# Patient Record
Sex: MALE | Race: WHITE | NOT HISPANIC OR LATINO
[De-identification: names, ages, dates, MRNs, and addresses within clinical notes are randomized per-mention and may not be internally consistent; named-entity substitution may affect disease eponyms.]

---

## 2017-01-11 ENCOUNTER — APPOINTMENT (OUTPATIENT)
Dept: FAMILY MEDICINE | Facility: CLINIC | Age: 72
End: 2017-01-11

## 2017-01-11 VITALS
DIASTOLIC BLOOD PRESSURE: 78 MMHG | RESPIRATION RATE: 18 BRPM | HEART RATE: 74 BPM | TEMPERATURE: 98.4 F | SYSTOLIC BLOOD PRESSURE: 124 MMHG

## 2017-04-12 ENCOUNTER — APPOINTMENT (OUTPATIENT)
Dept: FAMILY MEDICINE | Facility: CLINIC | Age: 72
End: 2017-04-12

## 2017-04-13 VITALS
TEMPERATURE: 98.8 F | RESPIRATION RATE: 18 BRPM | HEART RATE: 78 BPM | SYSTOLIC BLOOD PRESSURE: 124 MMHG | DIASTOLIC BLOOD PRESSURE: 78 MMHG

## 2017-06-14 ENCOUNTER — APPOINTMENT (OUTPATIENT)
Dept: FAMILY MEDICINE | Facility: CLINIC | Age: 72
End: 2017-06-14

## 2017-06-14 VITALS
TEMPERATURE: 98.6 F | SYSTOLIC BLOOD PRESSURE: 130 MMHG | RESPIRATION RATE: 20 BRPM | HEART RATE: 76 BPM | DIASTOLIC BLOOD PRESSURE: 82 MMHG

## 2017-06-15 LAB
ALBUMIN SERPL ELPH-MCNC: 4.5 G/DL
ALP BLD-CCNC: 63 U/L
ALT SERPL-CCNC: 16 U/L
ANION GAP SERPL CALC-SCNC: 14 MMOL/L
AST SERPL-CCNC: 22 U/L
BASOPHILS # BLD AUTO: 0.01 K/UL
BASOPHILS NFR BLD AUTO: 0.2 %
BILIRUB SERPL-MCNC: 0.8 MG/DL
BUN SERPL-MCNC: 12 MG/DL
CALCIUM SERPL-MCNC: 9.2 MG/DL
CHLORIDE SERPL-SCNC: 98 MMOL/L
CHOLEST SERPL-MCNC: 213 MG/DL
CHOLEST/HDLC SERPL: 5.1 RATIO
CO2 SERPL-SCNC: 25 MMOL/L
CREAT SERPL-MCNC: 0.87 MG/DL
EOSINOPHIL # BLD AUTO: 0 K/UL
EOSINOPHIL NFR BLD AUTO: 0 %
GLUCOSE SERPL-MCNC: 108 MG/DL
HCT VFR BLD CALC: 47.4 %
HDLC SERPL-MCNC: 42 MG/DL
HGB BLD-MCNC: 15 G/DL
IMM GRANULOCYTES NFR BLD AUTO: 0.2 %
LDLC SERPL CALC-MCNC: 126 MG/DL
LYMPHOCYTES # BLD AUTO: 1.39 K/UL
LYMPHOCYTES NFR BLD AUTO: 24.3 %
MAN DIFF?: NORMAL
MCHC RBC-ENTMCNC: 30.9 PG
MCHC RBC-ENTMCNC: 31.6 GM/DL
MCV RBC AUTO: 97.7 FL
MONOCYTES # BLD AUTO: 0.6 K/UL
MONOCYTES NFR BLD AUTO: 10.5 %
NEUTROPHILS # BLD AUTO: 3.7 K/UL
NEUTROPHILS NFR BLD AUTO: 64.8 %
PLATELET # BLD AUTO: 193 K/UL
POTASSIUM SERPL-SCNC: 4 MMOL/L
PROT SERPL-MCNC: 7.5 G/DL
RBC # BLD: 4.85 M/UL
RBC # FLD: 14.9 %
SODIUM SERPL-SCNC: 137 MMOL/L
TRIGL SERPL-MCNC: 225 MG/DL
TSH SERPL-ACNC: 1.53 UIU/ML
WBC # FLD AUTO: 5.71 K/UL

## 2017-08-28 ENCOUNTER — APPOINTMENT (OUTPATIENT)
Dept: FAMILY MEDICINE | Facility: CLINIC | Age: 72
End: 2017-08-28
Payer: MEDICARE

## 2017-08-28 VITALS
WEIGHT: 154 LBS | OXYGEN SATURATION: 97 % | HEIGHT: 69 IN | TEMPERATURE: 98.2 F | DIASTOLIC BLOOD PRESSURE: 83 MMHG | SYSTOLIC BLOOD PRESSURE: 124 MMHG | RESPIRATION RATE: 15 BRPM | HEART RATE: 67 BPM | BODY MASS INDEX: 22.81 KG/M2

## 2017-08-28 DIAGNOSIS — Z12.5 ENCOUNTER FOR SCREENING FOR MALIGNANT NEOPLASM OF PROSTATE: ICD-10-CM

## 2017-08-28 PROCEDURE — 99214 OFFICE O/P EST MOD 30 MIN: CPT

## 2017-09-13 ENCOUNTER — APPOINTMENT (OUTPATIENT)
Dept: FAMILY MEDICINE | Facility: CLINIC | Age: 72
End: 2017-09-13

## 2018-01-05 ENCOUNTER — RX RENEWAL (OUTPATIENT)
Age: 73
End: 2018-01-05

## 2018-01-11 ENCOUNTER — APPOINTMENT (OUTPATIENT)
Dept: FAMILY MEDICINE | Facility: CLINIC | Age: 73
End: 2018-01-11
Payer: MEDICARE

## 2018-01-11 VITALS
TEMPERATURE: 98.2 F | WEIGHT: 154 LBS | RESPIRATION RATE: 15 BRPM | DIASTOLIC BLOOD PRESSURE: 78 MMHG | SYSTOLIC BLOOD PRESSURE: 120 MMHG | BODY MASS INDEX: 22.81 KG/M2 | HEIGHT: 69 IN | OXYGEN SATURATION: 98 % | HEART RATE: 70 BPM

## 2018-01-11 DIAGNOSIS — Z23 ENCOUNTER FOR IMMUNIZATION: ICD-10-CM

## 2018-01-11 DIAGNOSIS — G47.00 INSOMNIA, UNSPECIFIED: ICD-10-CM

## 2018-01-11 DIAGNOSIS — F41.9 ANXIETY DISORDER, UNSPECIFIED: ICD-10-CM

## 2018-01-11 DIAGNOSIS — F32.9 MAJOR DEPRESSIVE DISORDER, SINGLE EPISODE, UNSPECIFIED: ICD-10-CM

## 2018-01-11 DIAGNOSIS — I10 ESSENTIAL (PRIMARY) HYPERTENSION: ICD-10-CM

## 2018-01-11 PROCEDURE — 99214 OFFICE O/P EST MOD 30 MIN: CPT | Mod: 25

## 2018-01-11 PROCEDURE — 90732 PPSV23 VACC 2 YRS+ SUBQ/IM: CPT

## 2018-01-11 PROCEDURE — G0009: CPT

## 2018-04-18 ENCOUNTER — RX RENEWAL (OUTPATIENT)
Age: 73
End: 2018-04-18

## 2018-04-18 DIAGNOSIS — Z23 ENCOUNTER FOR IMMUNIZATION: ICD-10-CM

## 2018-04-18 RX ORDER — ZOSTER VACCINE RECOMBINANT, ADJUVANTED 50 MCG/0.5
50 KIT INTRAMUSCULAR
Qty: 1 | Refills: 1 | Status: ACTIVE | COMMUNITY
Start: 2018-04-18 | End: 1900-01-01

## 2023-05-17 ENCOUNTER — INPATIENT (INPATIENT)
Facility: HOSPITAL | Age: 78
LOS: 4 days | Discharge: HOME | DRG: 884 | End: 2023-05-22
Attending: HOSPITALIST | Admitting: HOSPITALIST
Payer: MEDICARE

## 2023-05-17 VITALS
TEMPERATURE: 98 F | DIASTOLIC BLOOD PRESSURE: 120 MMHG | HEART RATE: 111 BPM | SYSTOLIC BLOOD PRESSURE: 184 MMHG | RESPIRATION RATE: 18 BRPM | OXYGEN SATURATION: 97 %

## 2023-05-17 DIAGNOSIS — F03.918 UNSPECIFIED DEMENTIA, UNSPECIFIED SEVERITY, WITH OTHER BEHAVIORAL DISTURBANCE: ICD-10-CM

## 2023-05-17 DIAGNOSIS — F09 UNSPECIFIED MENTAL DISORDER DUE TO KNOWN PHYSIOLOGICAL CONDITION: ICD-10-CM

## 2023-05-17 LAB
ALBUMIN SERPL ELPH-MCNC: 4.3 G/DL — SIGNIFICANT CHANGE UP (ref 3.3–5.2)
ALP SERPL-CCNC: 56 U/L — SIGNIFICANT CHANGE UP (ref 40–120)
ALT FLD-CCNC: 17 U/L — SIGNIFICANT CHANGE UP
ANION GAP SERPL CALC-SCNC: 14 MMOL/L — SIGNIFICANT CHANGE UP (ref 5–17)
APPEARANCE UR: CLEAR — SIGNIFICANT CHANGE UP
AST SERPL-CCNC: 21 U/L — SIGNIFICANT CHANGE UP
BACTERIA # UR AUTO: ABNORMAL
BASOPHILS # BLD AUTO: 0.03 K/UL — SIGNIFICANT CHANGE UP (ref 0–0.2)
BASOPHILS NFR BLD AUTO: 0.4 % — SIGNIFICANT CHANGE UP (ref 0–2)
BILIRUB SERPL-MCNC: 0.3 MG/DL — LOW (ref 0.4–2)
BILIRUB UR-MCNC: NEGATIVE — SIGNIFICANT CHANGE UP
BUN SERPL-MCNC: 16.4 MG/DL — SIGNIFICANT CHANGE UP (ref 8–20)
CALCIUM SERPL-MCNC: 9.1 MG/DL — SIGNIFICANT CHANGE UP (ref 8.4–10.5)
CHLORIDE SERPL-SCNC: 102 MMOL/L — SIGNIFICANT CHANGE UP (ref 96–108)
CO2 SERPL-SCNC: 24 MMOL/L — SIGNIFICANT CHANGE UP (ref 22–29)
COLOR SPEC: YELLOW — SIGNIFICANT CHANGE UP
CREAT SERPL-MCNC: 0.82 MG/DL — SIGNIFICANT CHANGE UP (ref 0.5–1.3)
DIFF PNL FLD: NEGATIVE — SIGNIFICANT CHANGE UP
EGFR: 91 ML/MIN/1.73M2 — SIGNIFICANT CHANGE UP
EOSINOPHIL # BLD AUTO: 0.01 K/UL — SIGNIFICANT CHANGE UP (ref 0–0.5)
EOSINOPHIL NFR BLD AUTO: 0.1 % — SIGNIFICANT CHANGE UP (ref 0–6)
EPI CELLS # UR: SIGNIFICANT CHANGE UP
FLUAV AG NPH QL: SIGNIFICANT CHANGE UP
FLUBV AG NPH QL: SIGNIFICANT CHANGE UP
GLUCOSE SERPL-MCNC: 112 MG/DL — HIGH (ref 70–99)
GLUCOSE UR QL: NEGATIVE MG/DL — SIGNIFICANT CHANGE UP
HCT VFR BLD CALC: 40.6 % — SIGNIFICANT CHANGE UP (ref 39–50)
HGB BLD-MCNC: 14.3 G/DL — SIGNIFICANT CHANGE UP (ref 13–17)
IMM GRANULOCYTES NFR BLD AUTO: 0.3 % — SIGNIFICANT CHANGE UP (ref 0–0.9)
KETONES UR-MCNC: NEGATIVE — SIGNIFICANT CHANGE UP
LEUKOCYTE ESTERASE UR-ACNC: NEGATIVE — SIGNIFICANT CHANGE UP
LYMPHOCYTES # BLD AUTO: 1.03 K/UL — SIGNIFICANT CHANGE UP (ref 1–3.3)
LYMPHOCYTES # BLD AUTO: 13.9 % — SIGNIFICANT CHANGE UP (ref 13–44)
MCHC RBC-ENTMCNC: 31.9 PG — SIGNIFICANT CHANGE UP (ref 27–34)
MCHC RBC-ENTMCNC: 35.2 GM/DL — SIGNIFICANT CHANGE UP (ref 32–36)
MCV RBC AUTO: 90.6 FL — SIGNIFICANT CHANGE UP (ref 80–100)
MONOCYTES # BLD AUTO: 0.73 K/UL — SIGNIFICANT CHANGE UP (ref 0–0.9)
MONOCYTES NFR BLD AUTO: 9.9 % — SIGNIFICANT CHANGE UP (ref 2–14)
NEUTROPHILS # BLD AUTO: 5.57 K/UL — SIGNIFICANT CHANGE UP (ref 1.8–7.4)
NEUTROPHILS NFR BLD AUTO: 75.4 % — SIGNIFICANT CHANGE UP (ref 43–77)
NITRITE UR-MCNC: NEGATIVE — SIGNIFICANT CHANGE UP
PH UR: 7 — SIGNIFICANT CHANGE UP (ref 5–8)
PLATELET # BLD AUTO: 210 K/UL — SIGNIFICANT CHANGE UP (ref 150–400)
POTASSIUM SERPL-MCNC: 3.9 MMOL/L — SIGNIFICANT CHANGE UP (ref 3.5–5.3)
POTASSIUM SERPL-SCNC: 3.9 MMOL/L — SIGNIFICANT CHANGE UP (ref 3.5–5.3)
PROT SERPL-MCNC: 6.7 G/DL — SIGNIFICANT CHANGE UP (ref 6.6–8.7)
PROT UR-MCNC: NEGATIVE — SIGNIFICANT CHANGE UP
RBC # BLD: 4.48 M/UL — SIGNIFICANT CHANGE UP (ref 4.2–5.8)
RBC # FLD: 12.8 % — SIGNIFICANT CHANGE UP (ref 10.3–14.5)
RBC CASTS # UR COMP ASSIST: SIGNIFICANT CHANGE UP /HPF (ref 0–4)
RSV RNA NPH QL NAA+NON-PROBE: SIGNIFICANT CHANGE UP
SARS-COV-2 RNA SPEC QL NAA+PROBE: SIGNIFICANT CHANGE UP
SODIUM SERPL-SCNC: 140 MMOL/L — SIGNIFICANT CHANGE UP (ref 135–145)
SP GR SPEC: 1.01 — SIGNIFICANT CHANGE UP (ref 1.01–1.02)
UROBILINOGEN FLD QL: NEGATIVE MG/DL — SIGNIFICANT CHANGE UP
WBC # BLD: 7.39 K/UL — SIGNIFICANT CHANGE UP (ref 3.8–10.5)
WBC # FLD AUTO: 7.39 K/UL — SIGNIFICANT CHANGE UP (ref 3.8–10.5)
WBC UR QL: SIGNIFICANT CHANGE UP /HPF (ref 0–5)

## 2023-05-17 PROCEDURE — 93010 ELECTROCARDIOGRAM REPORT: CPT

## 2023-05-17 PROCEDURE — 71045 X-RAY EXAM CHEST 1 VIEW: CPT | Mod: 26

## 2023-05-17 PROCEDURE — 99222 1ST HOSP IP/OBS MODERATE 55: CPT

## 2023-05-17 PROCEDURE — 70450 CT HEAD/BRAIN W/O DYE: CPT | Mod: 26,MA

## 2023-05-17 PROCEDURE — 99285 EMERGENCY DEPT VISIT HI MDM: CPT

## 2023-05-17 RX ORDER — LISINOPRIL 2.5 MG/1
10 TABLET ORAL DAILY
Refills: 0 | Status: DISCONTINUED | OUTPATIENT
Start: 2023-05-17 | End: 2023-05-22

## 2023-05-17 RX ORDER — ONDANSETRON 8 MG/1
4 TABLET, FILM COATED ORAL EVERY 8 HOURS
Refills: 0 | Status: DISCONTINUED | OUTPATIENT
Start: 2023-05-17 | End: 2023-05-22

## 2023-05-17 RX ORDER — QUETIAPINE FUMARATE 200 MG/1
25 TABLET, FILM COATED ORAL AT BEDTIME
Refills: 0 | Status: DISCONTINUED | OUTPATIENT
Start: 2023-05-17 | End: 2023-05-22

## 2023-05-17 RX ORDER — FINASTERIDE 5 MG/1
5 TABLET, FILM COATED ORAL DAILY
Refills: 0 | Status: DISCONTINUED | OUTPATIENT
Start: 2023-05-17 | End: 2023-05-22

## 2023-05-17 RX ORDER — ATORVASTATIN CALCIUM 80 MG/1
40 TABLET, FILM COATED ORAL AT BEDTIME
Refills: 0 | Status: DISCONTINUED | OUTPATIENT
Start: 2023-05-17 | End: 2023-05-22

## 2023-05-17 RX ORDER — LANOLIN ALCOHOL/MO/W.PET/CERES
3 CREAM (GRAM) TOPICAL AT BEDTIME
Refills: 0 | Status: DISCONTINUED | OUTPATIENT
Start: 2023-05-17 | End: 2023-05-22

## 2023-05-17 RX ORDER — QUETIAPINE FUMARATE 200 MG/1
12.5 TABLET, FILM COATED ORAL
Refills: 0 | Status: DISCONTINUED | OUTPATIENT
Start: 2023-05-17 | End: 2023-05-22

## 2023-05-17 RX ORDER — ATENOLOL 25 MG/1
50 TABLET ORAL DAILY
Refills: 0 | Status: DISCONTINUED | OUTPATIENT
Start: 2023-05-17 | End: 2023-05-22

## 2023-05-17 RX ORDER — ACETAMINOPHEN 500 MG
650 TABLET ORAL EVERY 6 HOURS
Refills: 0 | Status: DISCONTINUED | OUTPATIENT
Start: 2023-05-17 | End: 2023-05-22

## 2023-05-17 RX ORDER — SODIUM CHLORIDE 9 MG/ML
3 INJECTION INTRAMUSCULAR; INTRAVENOUS; SUBCUTANEOUS EVERY 8 HOURS
Refills: 0 | Status: DISCONTINUED | OUTPATIENT
Start: 2023-05-17 | End: 2023-05-21

## 2023-05-17 RX ORDER — QUETIAPINE FUMARATE 200 MG/1
25 TABLET, FILM COATED ORAL ONCE
Refills: 0 | Status: COMPLETED | OUTPATIENT
Start: 2023-05-17 | End: 2023-05-17

## 2023-05-17 RX ORDER — TAMSULOSIN HYDROCHLORIDE 0.4 MG/1
0.4 CAPSULE ORAL AT BEDTIME
Refills: 0 | Status: DISCONTINUED | OUTPATIENT
Start: 2023-05-17 | End: 2023-05-22

## 2023-05-17 RX ORDER — OLANZAPINE 15 MG/1
5 TABLET, FILM COATED ORAL ONCE
Refills: 0 | Status: COMPLETED | OUTPATIENT
Start: 2023-05-17 | End: 2023-05-17

## 2023-05-17 RX ADMIN — SODIUM CHLORIDE 3 MILLILITER(S): 9 INJECTION INTRAMUSCULAR; INTRAVENOUS; SUBCUTANEOUS at 22:38

## 2023-05-17 RX ADMIN — OLANZAPINE 5 MILLIGRAM(S): 15 TABLET, FILM COATED ORAL at 20:15

## 2023-05-17 RX ADMIN — QUETIAPINE FUMARATE 25 MILLIGRAM(S): 200 TABLET, FILM COATED ORAL at 23:20

## 2023-05-17 RX ADMIN — TAMSULOSIN HYDROCHLORIDE 0.4 MILLIGRAM(S): 0.4 CAPSULE ORAL at 23:20

## 2023-05-17 RX ADMIN — ATORVASTATIN CALCIUM 40 MILLIGRAM(S): 80 TABLET, FILM COATED ORAL at 23:20
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
## 2023-05-17 NOTE — H&P ADULT - ASSESSMENT
79 y/o male with Dementia, HTN, HLD, PH    Dementia:  -Obtain recent w/u done at [place]  -No evidence of infectious/metabolic cause here  -CTH with severe microvascular disease, likely Vascular dementia   - notes reviewed, meds ordered  -Currently calm and cooperative   - for placement  -OOB, ambulate, fall risk, 1:1  -VC boots while in bed    HTN:  -DASH diet  -Resume Atenolol     HLD:  -Statin    PH:  -Proscar/Flomax  -No LUTs     Discussed with ED staff 79 y/o male with Dementia, HTN, HLD, PH    Dementia:  -Obtain recent w/u done at [place]  -No evidence of infectious/metabolic cause here  -CTH with severe microvascular disease, likely Vascular dementia   - notes reviewed, meds ordered  -Currently calm and cooperative   -Check TSH, B12, FA, RPR  -SW for placement  -OOB, ambulate, fall risk, 1:1  -VC boots while in bed    HTN:  -DASH diet  -Resume Atenolol     HLD:  -Statin    PH:  -Proscar/Flomax  -No LUTs     Discussed with ED staff
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
## 2023-05-17 NOTE — ED ADULT NURSE REASSESSMENT NOTE - NS ED NURSE REASSESS COMMENT FT1
assumed care from KARLEY Mckee. pt is 79 y/o/m w/pmhx HLD, HTN and BPH presents with brother c/o worsening AMS. pt was seen and eval at [facility] 3 weeks ago and dx with dementia with full workup. pt son is concerned as he got aggressive today feels he is unable to care for his brother. brother Broderick at bedside. pt is awake and alert x 1-2, self and knows he is in the hospital. pt does not recognize his brother, states "I don't know that nahid". pt continues to walk around unit, redirectable back to stretcher. charge RN aware the need for 1:1 as pt keeps going in other patients rooms.

## 2023-05-17 NOTE — ED BEHAVIORAL HEALTH ASSESSMENT NOTE - DESCRIPTION
HLD, HTN, BPH T(C): 36.5 (05-17-23 @ 18:31), Max: 36.8 (05-17-23 @ 16:25)  T(F): 97.7 (05-17-23 @ 18:31), Max: 98.3 (05-17-23 @ 16:25)  HR: 81 (05-17-23 @ 18:31) (81 - 111)  BP: 179/92 (05-17-23 @ 18:31) (179/92 - 184/120)  RR: 16 (05-17-23 @ 18:31) (16 - 18)  SpO2: 96% (05-17-23 @ 18:31) (96% - 97%) single, never , no children retired

## 2023-05-17 NOTE — ED BEHAVIORAL HEALTH ASSESSMENT NOTE - OTHER
family brother limited impaired complete medical workup to rule out causes of Delirium, to provide Dementia diagnosis and possible long tern placement
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
## 2023-05-17 NOTE — ED ADULT NURSE NOTE - NSFALLRISKINTERV_ED_ALL_ED

## 2023-05-17 NOTE — ED ADULT NURSE REASSESSMENT NOTE - NSFALLHARMRISKINTERV_ED_ALL_ED
Assistance OOB with selected safe patient handling equipment if applicable/Communicate risk of Fall with Harm to all staff, patient, and family/Monitor for mental status changes and reorient to person, place, and time, as needed/Move patient closer to nursing station/within visual sight of ED staff/Provide visual cue: red socks, yellow wristband, yellow gown, etc/Reinforce activity limits and safety measures with patient and family/Toileting schedule using arm’s reach rule for commode and bathroom/Use of alarms - bed, stretcher, chair and/or video monitoring/Bed in lowest position, wheels locked, appropriate side rails in place/Call bell, personal items and telephone in reach/Instruct patient to call for assistance before getting out of bed/chair/stretcher/Non-slip footwear applied when patient is off stretcher/[place] to call system/Physically safe environment - no spills, clutter or unnecessary equipment/Purposeful Proactive Rounding/Room/bathroom lighting operational, light cord in reach

## 2023-05-17 NOTE — H&P ADULT - HISTORY OF PRESENT ILLNESS
77 y/o male with hx of HTN, PH, HLD, recently diagnosed Dementia after a year of stepwise decline in cognitive function and ADLs. Had a recent evaluation at [facility] with a reported negative metabolic/infection/CVA w/u. He is no longer able to live on his own and was living with his family for past 3 weeks. Brought in today due to periods of aggression and agitation. Patient also not taking his usual medications at times. No reported falls, fever, N/V, SOB, CP. Patient is ambulatory with no devices. In the ED needed Zyprexa for agitation. Vital with elevated BP, exam non-focal, CTH with chronic microvascular changes, Labs normal. No reports of substance abuse. Seen by BH/CM in ED and at this time will need evaluation for placement due to unsafe DC and family unable to care for him at home. Patient at this time not c/o CP, HA, N/V, SOB.

## 2023-05-17 NOTE — ED ADULT NURSE NOTE - OBJECTIVE STATEMENT
Pt care acquired at 1700. Pt is A&Ox4 with bizarre behavior. Pt arrives at ED due to elloping from home multiple times. PT has hx of severe dementia but is oriented to self and place on assessment. PT denies pain or discomfort. Pt awaiting SW consult.

## 2023-05-17 NOTE — ED PROVIDER NOTE - CLINICAL SUMMARY MEDICAL DECISION MAKING FREE TEXT BOX
78y M w/ hx HTN, HLD, BPH, presenting for worsening dementia and agitation. No acute findings on workup to explain mental status. Treated with zyprexa for agitation. 1:1 ordered for safety. Psych/case management consulted; advising inpatient medical admission pending longterm placement.

## 2023-05-17 NOTE — ED PROVIDER NOTE - PROGRESS NOTE DETAILS
Guerra: Pt received in signout from Dr. Israel. Pt increasingly agitated and uncooperative despite attempts at verbal redirection. Treated with zyprexa with improvement. Case discussed with psych/case management team. Pt is a danger to other family members and likely needs longterm placement. Psych advising inpatient medical admission as it will likely take at least 3 days for him to be placed. No acute medical findings on workup. Case discussed with hospitalist for admission.
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
## 2023-05-17 NOTE — H&P ADULT - NSICDXPASTMEDICALHX_GEN_ALL_CORE_FT
PAST MEDICAL HISTORY:  Dementia     Enlarged prostate     HLD (hyperlipidemia)     HTN (hypertension)

## 2023-05-18 LAB
CULTURE RESULTS: SIGNIFICANT CHANGE UP
FOLATE SERPL-MCNC: 16.6 NG/ML — SIGNIFICANT CHANGE UP
HCV AB S/CO SERPL IA: 0.07 S/CO — SIGNIFICANT CHANGE UP (ref 0–0.99)
HCV AB SERPL-IMP: SIGNIFICANT CHANGE UP
SPECIMEN SOURCE: SIGNIFICANT CHANGE UP
T PALLIDUM AB TITR SER: NEGATIVE — SIGNIFICANT CHANGE UP
TSH SERPL-MCNC: 2.47 UIU/ML — SIGNIFICANT CHANGE UP (ref 0.27–4.2)
TSH SERPL-MCNC: 2.51 UIU/ML — SIGNIFICANT CHANGE UP (ref 0.27–4.2)
VIT B12 SERPL-MCNC: 348 PG/ML — SIGNIFICANT CHANGE UP (ref 232–1245)

## 2023-05-18 PROCEDURE — 99223 1ST HOSP IP/OBS HIGH 75: CPT

## 2023-05-18 PROCEDURE — 99233 SBSQ HOSP IP/OBS HIGH 50: CPT

## 2023-05-18 PROCEDURE — 99222 1ST HOSP IP/OBS MODERATE 55: CPT

## 2023-05-18 RX ORDER — HALOPERIDOL DECANOATE 100 MG/ML
5 INJECTION INTRAMUSCULAR ONCE
Refills: 0 | Status: COMPLETED | OUTPATIENT
Start: 2023-05-18 | End: 2023-05-18

## 2023-05-18 RX ORDER — OLANZAPINE 15 MG/1
5 TABLET, FILM COATED ORAL ONCE
Refills: 0 | Status: COMPLETED | OUTPATIENT
Start: 2023-05-18 | End: 2023-05-18

## 2023-05-18 RX ORDER — OLANZAPINE 15 MG/1
2.5 TABLET, FILM COATED ORAL ONCE
Refills: 0 | Status: COMPLETED | OUTPATIENT
Start: 2023-05-18 | End: 2023-05-18

## 2023-05-18 RX ORDER — OLANZAPINE 15 MG/1
2.5 TABLET, FILM COATED ORAL ONCE
Refills: 0 | Status: DISCONTINUED | OUTPATIENT
Start: 2023-05-18 | End: 2023-05-18

## 2023-05-18 RX ADMIN — SODIUM CHLORIDE 3 MILLILITER(S): 9 INJECTION INTRAMUSCULAR; INTRAVENOUS; SUBCUTANEOUS at 09:55

## 2023-05-18 RX ADMIN — HALOPERIDOL DECANOATE 5 MILLIGRAM(S): 100 INJECTION INTRAMUSCULAR at 19:56

## 2023-05-18 RX ADMIN — TAMSULOSIN HYDROCHLORIDE 0.4 MILLIGRAM(S): 0.4 CAPSULE ORAL at 21:48

## 2023-05-18 RX ADMIN — OLANZAPINE 5 MILLIGRAM(S): 15 TABLET, FILM COATED ORAL at 19:57

## 2023-05-18 RX ADMIN — SODIUM CHLORIDE 3 MILLILITER(S): 9 INJECTION INTRAMUSCULAR; INTRAVENOUS; SUBCUTANEOUS at 05:15

## 2023-05-18 RX ADMIN — ATENOLOL 50 MILLIGRAM(S): 25 TABLET ORAL at 06:02

## 2023-05-18 RX ADMIN — Medication 2 MILLIGRAM(S): at 19:56

## 2023-05-18 RX ADMIN — QUETIAPINE FUMARATE 12.5 MILLIGRAM(S): 200 TABLET, FILM COATED ORAL at 06:02

## 2023-05-18 RX ADMIN — SODIUM CHLORIDE 3 MILLILITER(S): 9 INJECTION INTRAMUSCULAR; INTRAVENOUS; SUBCUTANEOUS at 22:04

## 2023-05-18 RX ADMIN — QUETIAPINE FUMARATE 25 MILLIGRAM(S): 200 TABLET, FILM COATED ORAL at 21:48

## 2023-05-18 RX ADMIN — ATORVASTATIN CALCIUM 40 MILLIGRAM(S): 80 TABLET, FILM COATED ORAL at 21:48

## 2023-05-18 RX ADMIN — OLANZAPINE 5 MILLIGRAM(S): 15 TABLET, FILM COATED ORAL at 17:32

## 2023-05-18 RX ADMIN — FINASTERIDE 5 MILLIGRAM(S): 5 TABLET, FILM COATED ORAL at 11:01

## 2023-05-18 RX ADMIN — OLANZAPINE 2.5 MILLIGRAM(S): 15 TABLET, FILM COATED ORAL at 18:22

## 2023-05-18 RX ADMIN — LISINOPRIL 10 MILLIGRAM(S): 2.5 TABLET ORAL at 06:02

## 2023-05-18 NOTE — CHART NOTE - NSCHARTNOTEFT_GEN_A_CORE
Palliative care social work note.    SW and palliative care NP Brianna Patel met with patient who is pleasantly confused, dx of dementia. SW aware patient living alone and has no children. Patient has a brother Broderick. Patient initially verbalizing involvement of Broderick in his life and attempt was made to complete HCP . Patient was engaging initially with ability to identify to team that HCP would be person to make decisions in the event patient can no longer . Patient began to rd HCP form and became increasingly paranoid and then could no longer provide clarity of details or thoughts. Patient unable to make detailed medical decisions in current state and paranoia heightened with discussion that Broderick could be involved to help patient. Patient at present unable to have meaningful discussion regarding advance care planning and not insightful or able to rationalize thoughts regarding why or why not brother should be involved or if he felt comfortable with any other extended family member to assist.

## 2023-05-18 NOTE — CONSULT NOTE ADULT - ASSESSMENT
This is a 78 year old male PMHx  HTN, PH, HLD, Dementia arrived to [facility] due to aggression and agitation. He is no longer able to live on his own and was living with his family for past 3 weeks. No reported falls, fever, N/V, SOB, CP. Patient is ambulatory with no devices. In the ED needed Zyprexa for agitation. Vital with elevated BP, exam non-focal, CTH with chronic microvascular changes, Labs normal. Seen by BH/CM in ED and at this time will need evaluation for placement due to unsafe DC and family unable to care for him at home. Palliative care consulted from  for ED Project.    Problem/Recommendation 1:Dementia  Patient with moderate dementia - Functional Assessment Staging Scale (FAST)-4- 5 - IADLs become affected, starting to need assist   Patient able to recognize his short term memory is deteriorating but long term is still stable - able to hold conversation but goes off on tangents and needs to be redirected -Noted some paranoia like thought processes  Still able to, sit stand, walk independently - able to recognize needs for toileting and bathing    Problem/Recommendation 2:AMS/Delirium  Consider trying to avoid/minimize deliriogenic drugs such as benzodiazepines and anticholinergics   Non pharmacologic options for delirium: Frequently orient patient, identify self, maintain consistent staffing and location   Limit stimulation, soft voice, soft lighting, gentle handling  Sleep disturbance support  Provide adequate sensory aides such as hearing aids, glasses, calendar, clock  Soft warm blankets  Bed alarm for safety    Problem/Recommendation 3: Debility  Assist in ADLS  Maintain safety, fall, aspiration precautions  Set bed alarm, chair alarm for safety and fall prevention  Turn and Position in bed     Problem/Recommendation 4: Palliative Care Encounter  Met with patient at bedside, introduced Palliative Care Team and Services at [facility].  Along side with me Palliative care  Miriam Parnell LCSW  Patient able to recognize his short term memory is deteriorating but long term remains more intact- able to hold conversation for a short while but goes off on tangents, becomes illogical, and needs to be redirected -Noted some paranoia like thought processes  Still able to, sit stand, walk independently - able to recognize needs for toileting and bathing  Patient states his brother Broderick (and his family) are the only family members he has  - that he has never been  or had children of his own  Patient unable to appoint HCP - he was unable to explain the reasoning and also unable to identify who he would like to appoint  - It does not appear he has capacity to make a decision regarding HCP designation as he is unable to provide explanation into reasoning/decision    Palliative care to follow    Surrogate/HCP/Guardian: Phone#:Broderick Bowles (brother) [phone]    Total Time Spent__50 minutes  This includes chart review, patient assessment, discussion and collaboration with interdisciplinary team members, ACP planning  COUNSELING:  Face to face meeting to discuss Advanced Care Planning - Time Spent _18___Minutes.      Thank you for the opportunity to assist with the care of this patient.   [facility] Palliative Medicine Consult Service [phone]. 
The patient is a 78y Male with dementia now with increased agitation.    Dementia.   Given stepwise decline and CT findings, likely vascular dementia.  His work up for reversible causes, negative.    Agitation.  Seen by Behavioral Health Team.  Agree with Quetiapine for now.     Discharge planning.   Will likely need long term care at a facility with a dementia unit.    Case discussed with Dr Robbins.

## 2023-05-18 NOTE — PROGRESS NOTE ADULT - ASSESSMENT
77 y/o male with Dementia, HTN, HLD, PH requiring placement.     Dementia  -No evidence of infectious/metabolic cause   -CTH with severe microvascular disease, likely Vascular dementia   - notes noted  -Currently calm and cooperative   - for placement  -OOB, ambulate, fall risk, 1:1  - Seroquel 25mg nightly, 12.5mg BID    HTN  - Atenolol 50mg daily  - Lisinopril 10mg daily    HLD  - Lipitor 40mg nightly    PH  -Proscar/Flomax  -No LUTs     DVT ppx  - SCD

## 2023-05-18 NOTE — PATIENT PROFILE ADULT - FALL HARM RISK - HARM RISK INTERVENTIONS
Assistance OOB with selected safe patient handling equipment/Communicate Risk of Fall with Harm to all staff/Monitor for mental status changes/Move patient closer to nurses' station/Reinforce activity limits and safety measures with patient and family/Reorient to person, place and time as needed/Tailored Fall Risk Interventions/Toileting schedule using arm’s reach rule for commode and bathroom/Use of alarms - bed, chair and/or voice tab/Visual Cue: Yellow wristband and red socks/Bed in lowest position, wheels locked, appropriate side rails in place/Call bell, personal items and telephone in reach/Instruct patient to call for assistance before getting out of bed or chair/Non-slip footwear when patient is out of bed/[place] to call system/Physically safe environment - no spills, clutter or unnecessary equipment/Purposeful Proactive Rounding/Room/bathroom lighting operational, light cord in reach

## 2023-05-18 NOTE — PROGRESS NOTE ADULT - SUBJECTIVE AND OBJECTIVE BOX
Chief complaint: Dementia    Patient seen and examined at bedside. No acute overnight events reported. Patient states he is doing fine, no fever, chills, cough, nausea or vomiting.     Vital Signs Last 24 Hrs  T(F): 97.7 (18 May 2023 07:49), Max: 98.3 (17 May 2023 16:25)  HR: 59 (18 May 2023 07:49) (59 - 111)  BP: 106/71 (18 May 2023 07:49) (106/71 - 184/120)  RR: 18 (18 May 2023 07:49) (16 - 18)  SpO2: 99% (18 May 2023 07:49) (96% - 99%)    Physical Exam:  Constitutional: alert and oriented, in no acute distress   Neck: Soft and supple  Respiratory: Clear to auscultation bilaterally, no wheezes or crackles  Cardiovascular: Regular rate and rhythm   Gastrointestinal: Soft, non-tender to palpation, +bs  Vascular: 2+ peripheral pulses  Musculoskeletal: 5/5 strength b/l upper and lower extremities, no lower extremity edema bilaterally    Labs:                        14.3   7.39  )-----------( 210      ( 17 May 2023 17:18 )             40.6   05-17    140  |  102  |  16.4  ----------------------------<  112<H>  3.9   |  24.0  |  0.82    Ca    9.1      17 May 2023 17:18    TPro  6.7  /  Alb  4.3  /  TBili  0.3<L>  /  DBili  x   /  AST  21  /  ALT  17  /  AlkPhos  56  05-17
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
## 2023-05-18 NOTE — CONSULT NOTE ADULT - SUBJECTIVE AND OBJECTIVE BOX
[facility]                                        [facility] at [place]                                  Елена Bradley, & Alejandro                                      [address]                                           [place], [place], [address]                                                [phone]        CC: Dementia and agitation.    HISTORY:  The patient is a 78y Male who has had a cognitive decline and recently diagnosed with dementia now presents with increased agitation and aggression to the point where family is now having difficulty caring for him.     PAST MEDICAL & SURGICAL HISTORY:  HTN (hypertension)  HLD (hyperlipidemia)  Enlarged prostate  Dementia  No significant past surgical history    MEDICATIONS  (STANDING):  atenolol  Tablet 50 milliGRAM(s) Oral daily  atorvastatin 40 milliGRAM(s) Oral at bedtime  finasteride 5 milliGRAM(s) Oral daily  lisinopril 10 milliGRAM(s) Oral daily  QUEtiapine 25 milliGRAM(s) Oral at bedtime  QUEtiapine 12.5 milliGRAM(s) Oral two times a day  sodium chloride 0.9% lock flush 3 milliLiter(s) IV Push every 8 hours  tamsulosin 0.4 milliGRAM(s) Oral at bedtime    MEDICATIONS  (PRN):  acetaminophen     Tablet .. 650 milliGRAM(s) Oral every 6 hours PRN Temp greater or equal to 38C (100.4F), Mild Pain (1 - 3)  aluminum hydroxide/magnesium hydroxide/simethicone Suspension 30 milliLiter(s) Oral every 4 hours PRN Dyspepsia  melatonin 3 milliGRAM(s) Oral at bedtime PRN Insomnia  ondansetron Injectable 4 milliGRAM(s) IV Push every 8 hours PRN Nausea and/or Vomiting    Allergies  Allergy Status Unknown    SOCIAL HISTORY:  Non smoker.     FAMILY HISTORY:  No pertinent family history in first degree relatives  Unable to provide further family history.    ROS:  Constitutional: The patient denies fevers or weight changes.  Neuro: As per HPI.  Eyes: Denies blurry vision.  Ears/nose/throat: Denies Tinnitus.   Cardiac: Denies chest pain. Denies palpitations.  Respiratory: Denies shortness of breath.  GI: Denies abdominal pain, nausea, or vomiting.  : Denies change in urinary pattern.  Integumentary: Denies rash.  Psych: Denies recent mood changes.  Heme: denies easy bleeding/bruising.    Exam:  Vital Signs Last 24 Hrs  T(C): 36.5 (18 May 2023 07:49), Max: 36.8 (17 May 2023 16:25)  T(F): 97.7 (18 May 2023 07:49), Max: 98.3 (17 May 2023 16:25)  HR: 59 (18 May 2023 07:49) (59 - 111)  BP: 106/71 (18 May 2023 07:49) (106/71 - 184/120)  BP(mean): 110 (17 May 2023 22:48) (110 - 110)  RR: 18 (18 May 2023 07:49) (16 - 18)  SpO2: 99% (18 May 2023 07:49) (96% - 99%)    Parameters below as of 18 May 2023 07:49  Patient On (Oxygen Delivery Method): room air    General: NAD.   Carotid bruits absent.     Mental status: The patient is awake, alert, and fully oriented. There is no aphasia. Memory 0/3 @ 5 minutes. Attention span poor.     Cranial nerves: There is no papilledema. Pupils react symmetrically to light. There is no visual field deficit to confrontation. Extraocular motion is full with no nystagmus.  Facial sensation is intact. Facial musculature is symmetric. Palate elevates symmetrically. Tongue is midline.    Motor: There is normal bulk and tone.  Strength is 5/5 in the right arm and leg.   Strength is 5/5 in the left arm and leg.    Sensation: Intact to light touch and pin. There is no extinction to double simultaneous stimulation.    Reflexes: 2+ throughout and plantar responses are flexor.    Cerebellar: There is no dysmetria on finger to nose testing.    LABS:                         14.3   7.39  )-----------( 210      ( 17 May 2023 17:18 )             40.6       05-17    140  |  102  |  16.4  ----------------------------<  112<H>  3.9   |  24.0  |  0.82    Ca    9.1      17 May 2023 17:18    TPro  6.7  /  Alb  4.3  /  TBili  0.3<L>  /  DBili  x   /  AST  21  /  ALT  17  /  AlkPhos  56  05-17    B12: 348  TSH normal.    RADIOLOGY   CT head images reviewed (and concur with report): There is no acute pathology. There is extensive chronic ischemic white matter change.        
Palliative Care Consult  This is a 78 year old male PMHx  HTN, PH, HLD, Dementia arrived to [facility] due to aggression and agitation. He is no longer able to live on his own and was living with his family for past 3 weeks. No reported falls, fever, N/V, SOB, CP. Patient is ambulatory with no devices. In the ED needed Zyprexa for agitation. Vital with elevated BP, exam non-focal, CTH with chronic microvascular changes, Labs normal. Seen by BH/VELIA in ED and at this time will need evaluation for placement due to unsafe DC and family unable to care for him at home. Palliative care consulted from  for ED Project.     <HPI:  77 y/o male with hx of HTN, PH, HLD, recently diagnosed Dementia after a year of stepwise decline in cognitive function and ADLs. Had a recent evaluation at [facility] with a reported negative metabolic/infection/CVA w/u. He is no longer able to live on his own and was living with his family for past 3 weeks. Brought in today due to periods of aggression and agitation. Patient also not taking his usual medications at times. No reported falls, fever, N/V, SOB, CP. Patient is ambulatory with no devices. In the ED needed Zyprexa for agitation. Vital with elevated BP, exam non-focal, CTH with chronic microvascular changes, Labs normal. No reports of substance abuse. Seen by CODEY/VELIA in ED and at this time will need evaluation for placement due to unsafe DC and family unable to care for him at home. Patient at this time not c/o CP, HA, N/V, SOB.  (17 May 2023 22:48)> end of copied text      PERTINENT PMH REVIEWED: Yes     PAST MEDICAL & SURGICAL HISTORY:  HTN (hypertension)      HLD (hyperlipidemia)      Enlarged prostate      Dementia      No significant past surgical history          SOCIAL HISTORY:                      Substance history: none                    Admitted from:  home                     Yazidism/spirituality: none                     Cultural concerns: none                      Surrogate/HCP/Guardian: Phone#:Broderick Bowles Brother [phone]    FAMILY HISTORY:  No family history related to admission diagonsis    Allergies  Allergy Status Unknown    ADVANCE DIRECTIVES/TREATMENT PREFERENCES:  Full Code     Baseline ADLs (prior to admission):  Independent with assist - lives alone - ambulatory     Karnofsky/Palliative Performance Status Version 2:  %50  http://npcrc.org/files/news/palliative_performance_scale_ppsv2.pdf    Present Symptoms: Unable to obtain due to poor mentation     Pain: Denies pain             Character-            Duration-            Effect-            Factors-            Frequency-            Location-            Severity-    Pain AD Score:0  http://geriatrictoolkit.[facility]/cog/painad.pdf (press ctrl + left click to view)    Review of Systems: Reviewed  Unable to obtain due to poor mentation     MEDICATIONS  (STANDING):  atenolol  Tablet 50 milliGRAM(s) Oral daily  atorvastatin 40 milliGRAM(s) Oral at bedtime  finasteride 5 milliGRAM(s) Oral daily  lisinopril 10 milliGRAM(s) Oral daily  QUEtiapine 25 milliGRAM(s) Oral at bedtime  QUEtiapine 12.5 milliGRAM(s) Oral two times a day  sodium chloride 0.9% lock flush 3 milliLiter(s) IV Push every 8 hours  tamsulosin 0.4 milliGRAM(s) Oral at bedtime    MEDICATIONS  (PRN):  acetaminophen     Tablet .. 650 milliGRAM(s) Oral every 6 hours PRN Temp greater or equal to 38C (100.4F), Mild Pain (1 - 3)  aluminum hydroxide/magnesium hydroxide/simethicone Suspension 30 milliLiter(s) Oral every 4 hours PRN Dyspepsia  melatonin 3 milliGRAM(s) Oral at bedtime PRN Insomnia  ondansetron Injectable 4 milliGRAM(s) IV Push every 8 hours PRN Nausea and/or Vomiting      PHYSICAL EXAM:    Vital Signs Last 24 Hrs  T(C): 36.5 (18 May 2023 07:49), Max: 36.8 (17 May 2023 16:25)  T(F): 97.7 (18 May 2023 07:49), Max: 98.3 (17 May 2023 16:25)  HR: 59 (18 May 2023 07:49) (59 - 111)  BP: 106/71 (18 May 2023 07:49) (106/71 - 184/120)  BP(mean): 110 (17 May 2023 22:48) (110 - 110)  RR: 18 (18 May 2023 07:49) (16 - 18)  SpO2: 99% (18 May 2023 07:49) (96% - 99%)    Parameters below as of 18 May 2023 07:49  Patient On (Oxygen Delivery Method): room air    General: alert oriented x 2-3 - mentation fluctuates     HEENT: dry mouth      Lungs: comfortable     CV: normal      GI:  incontinent     : incontinent      MSK: weaknes  bedbound/wheelchair bound    Neuro:  cognitive impairment     Skin: normal     LABS:                        14.3   7.39  )-----------( 210      ( 17 May 2023 17:18 )             40.6         140  |  102  |  16.4  ----------------------------<  112<H>  3.9   |  24.0  |  0.82    Ca    9.1      17 May 2023 17:18    TPro  6.7  /  Alb  4.3  /  TBili  0.3<L>  /  DBili  x   /  AST  21  /  ALT  17  /  AlkPhos  56        Urinalysis Basic - ( 17 May 2023 17:18 )    Color: Yellow / Appearance: Clear / S.010 / pH: x  Gluc: x / Ketone: Negative  / Bili: Negative / Urobili: Negative mg/dL   Blood: x / Protein: Negative / Nitrite: Negative   Leuk Esterase: Negative / RBC: 0-2 /HPF / WBC 0-2 /HPF   Sq Epi: x / Non Sq Epi: x / Bacteria: Occasional    I&O's Summary    RADIOLOGY & ADDITIONAL STUDIES:  CT head 23  IMPRESSION:  No acute intracranial hemorrhage or acute territorial infarction.   Extensive chronic microvascular ischemic changes. If symptoms persist   consider follow-up imaging with MRI of the brain if no contraindications.

## 2023-05-19 LAB
ANION GAP SERPL CALC-SCNC: 12 MMOL/L — SIGNIFICANT CHANGE UP (ref 5–17)
BASOPHILS # BLD AUTO: 0.03 K/UL — SIGNIFICANT CHANGE UP (ref 0–0.2)
BASOPHILS NFR BLD AUTO: 0.4 % — SIGNIFICANT CHANGE UP (ref 0–2)
BUN SERPL-MCNC: 18.8 MG/DL — SIGNIFICANT CHANGE UP (ref 8–20)
CALCIUM SERPL-MCNC: 8.8 MG/DL — SIGNIFICANT CHANGE UP (ref 8.4–10.5)
CHLORIDE SERPL-SCNC: 107 MMOL/L — SIGNIFICANT CHANGE UP (ref 96–108)
CO2 SERPL-SCNC: 25 MMOL/L — SIGNIFICANT CHANGE UP (ref 22–29)
CREAT SERPL-MCNC: 0.77 MG/DL — SIGNIFICANT CHANGE UP (ref 0.5–1.3)
EGFR: 93 ML/MIN/1.73M2 — SIGNIFICANT CHANGE UP
EOSINOPHIL # BLD AUTO: 0.01 K/UL — SIGNIFICANT CHANGE UP (ref 0–0.5)
EOSINOPHIL NFR BLD AUTO: 0.1 % — SIGNIFICANT CHANGE UP (ref 0–6)
GLUCOSE SERPL-MCNC: 94 MG/DL — SIGNIFICANT CHANGE UP (ref 70–99)
HCT VFR BLD CALC: 43.7 % — SIGNIFICANT CHANGE UP (ref 39–50)
HGB BLD-MCNC: 14.8 G/DL — SIGNIFICANT CHANGE UP (ref 13–17)
IMM GRANULOCYTES NFR BLD AUTO: 0.4 % — SIGNIFICANT CHANGE UP (ref 0–0.9)
LYMPHOCYTES # BLD AUTO: 1.52 K/UL — SIGNIFICANT CHANGE UP (ref 1–3.3)
LYMPHOCYTES # BLD AUTO: 18.6 % — SIGNIFICANT CHANGE UP (ref 13–44)
MCHC RBC-ENTMCNC: 32.1 PG — SIGNIFICANT CHANGE UP (ref 27–34)
MCHC RBC-ENTMCNC: 33.9 GM/DL — SIGNIFICANT CHANGE UP (ref 32–36)
MCV RBC AUTO: 94.8 FL — SIGNIFICANT CHANGE UP (ref 80–100)
MONOCYTES # BLD AUTO: 0.65 K/UL — SIGNIFICANT CHANGE UP (ref 0–0.9)
MONOCYTES NFR BLD AUTO: 8 % — SIGNIFICANT CHANGE UP (ref 2–14)
NEUTROPHILS # BLD AUTO: 5.92 K/UL — SIGNIFICANT CHANGE UP (ref 1.8–7.4)
NEUTROPHILS NFR BLD AUTO: 72.5 % — SIGNIFICANT CHANGE UP (ref 43–77)
PLATELET # BLD AUTO: 178 K/UL — SIGNIFICANT CHANGE UP (ref 150–400)
POTASSIUM SERPL-MCNC: 3.7 MMOL/L — SIGNIFICANT CHANGE UP (ref 3.5–5.3)
POTASSIUM SERPL-SCNC: 3.7 MMOL/L — SIGNIFICANT CHANGE UP (ref 3.5–5.3)
RBC # BLD: 4.61 M/UL — SIGNIFICANT CHANGE UP (ref 4.2–5.8)
RBC # FLD: 13 % — SIGNIFICANT CHANGE UP (ref 10.3–14.5)
SODIUM SERPL-SCNC: 144 MMOL/L — SIGNIFICANT CHANGE UP (ref 135–145)
WBC # BLD: 8.16 K/UL — SIGNIFICANT CHANGE UP (ref 3.8–10.5)
WBC # FLD AUTO: 8.16 K/UL — SIGNIFICANT CHANGE UP (ref 3.8–10.5)

## 2023-05-19 PROCEDURE — 99232 SBSQ HOSP IP/OBS MODERATE 35: CPT

## 2023-05-19 PROCEDURE — 99233 SBSQ HOSP IP/OBS HIGH 50: CPT

## 2023-05-19 PROCEDURE — 99497 ADVNCD CARE PLAN 30 MIN: CPT | Mod: 25

## 2023-05-19 RX ORDER — OLANZAPINE 15 MG/1
5 TABLET, FILM COATED ORAL ONCE
Refills: 0 | Status: COMPLETED | OUTPATIENT
Start: 2023-05-19 | End: 2023-05-19

## 2023-05-19 RX ADMIN — QUETIAPINE FUMARATE 25 MILLIGRAM(S): 200 TABLET, FILM COATED ORAL at 21:01

## 2023-05-19 RX ADMIN — FINASTERIDE 5 MILLIGRAM(S): 5 TABLET, FILM COATED ORAL at 11:20

## 2023-05-19 RX ADMIN — ATENOLOL 50 MILLIGRAM(S): 25 TABLET ORAL at 06:40

## 2023-05-19 RX ADMIN — QUETIAPINE FUMARATE 12.5 MILLIGRAM(S): 200 TABLET, FILM COATED ORAL at 17:24

## 2023-05-19 RX ADMIN — OLANZAPINE 5 MILLIGRAM(S): 15 TABLET, FILM COATED ORAL at 19:47

## 2023-05-19 RX ADMIN — Medication 1 MILLIGRAM(S): at 22:52

## 2023-05-19 RX ADMIN — SODIUM CHLORIDE 3 MILLILITER(S): 9 INJECTION INTRAMUSCULAR; INTRAVENOUS; SUBCUTANEOUS at 10:14

## 2023-05-19 RX ADMIN — ATORVASTATIN CALCIUM 40 MILLIGRAM(S): 80 TABLET, FILM COATED ORAL at 21:02

## 2023-05-19 RX ADMIN — LISINOPRIL 10 MILLIGRAM(S): 2.5 TABLET ORAL at 06:40

## 2023-05-19 RX ADMIN — SODIUM CHLORIDE 3 MILLILITER(S): 9 INJECTION INTRAMUSCULAR; INTRAVENOUS; SUBCUTANEOUS at 13:05

## 2023-05-19 RX ADMIN — SODIUM CHLORIDE 3 MILLILITER(S): 9 INJECTION INTRAMUSCULAR; INTRAVENOUS; SUBCUTANEOUS at 21:02

## 2023-05-19 RX ADMIN — TAMSULOSIN HYDROCHLORIDE 0.4 MILLIGRAM(S): 0.4 CAPSULE ORAL at 21:01

## 2023-05-19 RX ADMIN — QUETIAPINE FUMARATE 12.5 MILLIGRAM(S): 200 TABLET, FILM COATED ORAL at 06:40

## 2023-05-19 NOTE — BH CONSULTATION LIAISON PROGRESS NOTE - NSBHCHARTREVIEWLAB_PSY_A_CORE FT
05-19    144  |  107  |  18.8  ----------------------------<  94  3.7   |  25.0  |  0.77    Ca    8.8      19 May 2023 05:55    TPro  6.7  /  Alb  4.3  /  TBili  0.3<L>  /  DBili  x   /  AST  21  /  ALT  17  /  AlkPhos  56  05-17                          14.8   8.16  )-----------( 178      ( 19 May 2023 05:55 )             43.7

## 2023-05-19 NOTE — CHART NOTE - NSCHARTNOTEFT_GEN_A_CORE
Medicine PA-  Pt became agitated and was trying to get OOB despite redirecting.  Ordered zyprexa 5mg IM and 1:1 for safety, continue to monitor.  Addendum:    was calmer for brief period but then started yelling at staff and getting OOB again, ativan 1mg IVP given.  Continue to monitor, call PA if status changes. Medicine PA-  Pt became agitated and was trying to get OOB despite redirecting.  Ordered zyprexa 5mg IM and 1:1 for safety, continue to monitor.  Addendum:   Pt was calmer for brief period but then started yelling at staff and getting OOB again, uncooperative, ativan 1mg IVP given.  Continue to monitor, call PA if status changes.

## 2023-05-19 NOTE — PROGRESS NOTE ADULT - SUBJECTIVE AND OBJECTIVE BOX
[facility]                                        Ne[facility] at [place]                                 Елена Bradley & Alejandro                                  [address]                                        [place], [place], [address]                                             [phone]        CC: Dementia with agitation.    HPI:   The patient is a 78y Male who has had a cognitive decline and recently diagnosed with dementia now presents with increased agitation and aggression to the point where family is now having difficulty caring for him.     Interim history:  Now on 5 [place].     ROS:   Denies headache or dizziness.  Denies chest pain.  Denies shortness of breath.    MEDICATIONS  (STANDING):  atenolol  Tablet 50 milliGRAM(s) Oral daily  atorvastatin 40 milliGRAM(s) Oral at bedtime  finasteride 5 milliGRAM(s) Oral daily  lisinopril 10 milliGRAM(s) Oral daily  QUEtiapine 25 milliGRAM(s) Oral at bedtime  QUEtiapine 12.5 milliGRAM(s) Oral two times a day  sodium chloride 0.9% lock flush 3 milliLiter(s) IV Push every 8 hours  tamsulosin 0.4 milliGRAM(s) Oral at bedtime    Vital Signs Last 24 Hrs  T(C): 36.4 (19 May 2023 05:25), Max: 36.6 (18 May 2023 15:32)  T(F): 97.5 (19 May 2023 05:25), Max: 97.9 (18 May 2023 15:32)  HR: 56 (19 May 2023 05:25) (56 - 76)  BP: 132/79 (19 May 2023 05:25) (104/71 - 161/81)  RR: 19 (19 May 2023 05:25) (18 - 19)  SpO2: 96% (19 May 2023 05:25) (95% - 98%)    Parameters below as of 19 May 2023 05:25  Patient On (Oxygen Delivery Method): room air    Detailed Neurologic Exam:    Mental status: The patient is sleepy but awakens to voice. Memory poor. Follows only very simple instructions.    Cranial nerves: Pupils equal and react symmetrically to light. There is no visual field deficit to threat. Extraocular motion is full with no nystagmus. Facial sensation is intact. Facial musculature is symmetric. Palate elevates symmetrically. Tongue is midline.    Motor: There is normal bulk and tone.  There is no tremor.  Strength grossly 5/5 bilaterally.    Sensation: Grossly intact to light touch and pin.    Reflexes: 2+ throughout and plantar responses are flexor.    Cerebellar: No dysmetria on finger nose testing.    Labs:     05-19    144  |  107  |  18.8  ----------------------------<  94  3.7   |  25.0  |  0.77    Ca    8.8      19 May 2023 05:55    TPro  6.7  /  Alb  4.3  /  TBili  0.3<L>  /  DBili  x   /  AST  21  /  ALT  17  /  AlkPhos  56  05-17                            14.8   8.16  )-----------( 178      ( 19 May 2023 05:55 )             43.7

## 2023-05-19 NOTE — PROGRESS NOTE ADULT - CONVERSATION DETAILS
Met with patient at bedside-  calm in behavior during assessment   No obvious symptoms or signs of discomfort or distress  Reached out to Broderick to discuss GOC - at baseline patient is AO x 2 - is independent with walking and ADLS such as feeding and dressing per brother Broderick  Confirmed Broderick is his surrogate - he states his sister is also involved - no official HCP document   Surrogate decision making would fall equally to the both adult children unless form is provided indicating one over the other or primary/alternative   Patient was living independently at home alone and then recently moved in with Broderick as he became more symptomatic in regards to agitation and confusion  Broderick acknowledges the progression of his brother's dementia - goal is for him to return home with private hire aides and eventually he may transition him to LTC dementia unit   Discussed progression of dementia and eligibility for hospice support at home as his dementia advances - Broderick was receptive and stated he had his father on home hospice too for similar issues and this would be a consideration as his disease progresses  Discussed code status - Broderick states no living will or DNR has been established that he is aware of - he is Full Code CPR and intubation  Goal to continue to medically optimize  Discharge planning per social work/case management

## 2023-05-19 NOTE — BH CONSULTATION LIAISON ASSESSMENT NOTE - NSBHCHARTREVIEWVS_PSY_A_CORE FT
Vital Signs Last 24 Hrs  T(C): 36.4 (19 May 2023 05:25), Max: 36.6 (18 May 2023 15:32)  T(F): 97.5 (19 May 2023 05:25), Max: 97.9 (18 May 2023 15:32)  HR: 56 (19 May 2023 05:25) (56 - 76)  BP: 132/79 (19 May 2023 05:25) (104/71 - 161/81)  BP(mean): --  RR: 19 (19 May 2023 05:25) (18 - 19)  SpO2: 96% (19 May 2023 05:25) (95% - 98%)    Parameters below as of 19 May 2023 05:25  Patient On (Oxygen Delivery Method): room air

## 2023-05-19 NOTE — BH CONSULTATION LIAISON PROGRESS NOTE - NSBHASSESSMENTFT_PSY_ALL_CORE
79 yo swm, never , retired , no formal psych admissions, Tx, psych admissions suicide attempt, no known drug or alcohol use, PMH HLD, HTN, BPH, noncompliant with meds, bib ems from brothers home for aggression. Pt is poor historian due to cognitive deficits and reporting.  Pt reports he is in the hospital but does not known name.  Gave date of birth accurately and age.  Does not recognize the man with him (brother) and could not say what he used to do for work ().    At this time labs unremarkable, VSS, afebrile.  Hep C and Syphilis negative.  Folate and thiamine WNL.  Head CT with findings consistent with vascular dementia.  Differential dx delirium superimposed on dementia or progression dementia now with behavioral disturbances. Patient was somnolent on exam today which is change from previous documentation which likely 2/2 IM prns given yesterday during code grey.  Would recommend minimizing prn as this could precipitate or worsen delirium. Would recommend continuing current Seroquel dose as it was recently increased       Recommendations:  Continue Seroquel 25 mg hs  Continue Seroquel 12.5 bid prn  Zyprexa 2.5- 5 max q6h prn for agitation.    Delirium Precautions:       -Continue with verbal redirection/reorientation, Utilize family assistance virtually or in person for redirection and reorientation        -Early mobilization with efforts to move out of bed to chair, PT and ambulation as appropriate        -Maintain shades open during day,        -Consolidate sleep at night, -Minimize night time awakenings, and consider moving patient to quieter space on unit, minimize unnecessary nursing interventions especially during night-time      -Assess and optimize pain control      -Assess and promote normal and frequent healthy bowel movement       -Monitor for urinary retention      -Avoid the use of Benzodiazpeines, Anticholinergic medications, and Opioid pain meds as possible      -Minimize the use of physical restraints as possible      -Reserve the use of chemical restraints or IM psychotropics for behaviors endangering patient or staff safety only       -Address known sensory deficits with the use of necessary aids such as dentures, glasses, hearing aids, amplifiers etc.    -Continue to promote oral hydration and nutritional intake.

## 2023-05-19 NOTE — PROGRESS NOTE ADULT - SUBJECTIVE AND OBJECTIVE BOX
CHIEF COMPLAINT/INTERVAL HISTORY:    Patient is a 78y old  Male who presents with a chief complaint of Dementia  Agitation   Placement (19 May 2023 10:29)    SUBJECTIVE & OBJECTIVE: Pt seen and examined at bedside. Code Gray overnight but calm this morning. Psych follow up appreciated. AAO x 1 today. Discontinued constant obs.    ROS: Unobtainable due to cognition     ICU Vital Signs Last 24 Hrs  T(C): 36.8 (19 May 2023 16:15), Max: 36.8 (19 May 2023 16:15)  T(F): 98.2 (19 May 2023 16:15), Max: 98.2 (19 May 2023 16:15)  HR: 65 (19 May 2023 16:15) (56 - 76)  BP: 101/61 (19 May 2023 16:15) (101/61 - 161/81)  RR: 18 (19 May 2023 16:15) (18 - 19)  SpO2: 95% (19 May 2023 16:15) (95% - 98%)    O2 Parameters below as of 19 May 2023 16:15  Patient On (Oxygen Delivery Method): room air    MEDICATIONS  (STANDING):  atenolol  Tablet 50 milliGRAM(s) Oral daily  atorvastatin 40 milliGRAM(s) Oral at bedtime  finasteride 5 milliGRAM(s) Oral daily  lisinopril 10 milliGRAM(s) Oral daily  QUEtiapine 25 milliGRAM(s) Oral at bedtime  QUEtiapine 12.5 milliGRAM(s) Oral two times a day  sodium chloride 0.9% lock flush 3 milliLiter(s) IV Push every 8 hours  tamsulosin 0.4 milliGRAM(s) Oral at bedtime    MEDICATIONS  (PRN):  acetaminophen     Tablet .. 650 milliGRAM(s) Oral every 6 hours PRN Temp greater or equal to 38C (100.4F), Mild Pain (1 - 3)  aluminum hydroxide/magnesium hydroxide/simethicone Suspension 30 milliLiter(s) Oral every 4 hours PRN Dyspepsia  melatonin 3 milliGRAM(s) Oral at bedtime PRN Insomnia  ondansetron Injectable 4 milliGRAM(s) IV Push every 8 hours PRN Nausea and/or Vomiting      LABS:                        14.8   8.16  )-----------( 178      ( 19 May 2023 05:55 )             43.7         144  |  107  |  18.8  ----------------------------<  94  3.7   |  25.0  |  0.77    Ca    8.8      19 May 2023 05:55    TPro  6.7  /  Alb  4.3  /  TBili  0.3<L>  /  DBili  x   /  AST  21  /  ALT  17  /  AlkPhos  56        Urinalysis Basic - ( 17 May 2023 17:18 )    Color: Yellow / Appearance: Clear / S.010 / pH: x  Gluc: x / Ketone: Negative  / Bili: Negative / Urobili: Negative mg/dL   Blood: x / Protein: Negative / Nitrite: Negative   Leuk Esterase: Negative / RBC: 0-2 /HPF / WBC 0-2 /HPF   Sq Epi: x / Non Sq Epi: x / Bacteria: Occasional    PHYSICAL EXAM:    GENERAL: elderly male, laying in bed, NAD  HEAD:  Atraumatic, Normocephalic  EYES: EOMI, PERRLA, conjunctiva and sclera clear  ENMT: Moist mucous membranes  NECK: Supple   NERVOUS SYSTEM:  Alert & Oriented X1  CHEST/LUNG: Clear to auscultation bilaterally   HEART: Regular rate and rhythm; + S1/S2  ABDOMEN: Soft, Nontender, Nondistended; Bowel sounds present  EXTREMITIES:  no pedal edema

## 2023-05-19 NOTE — PROGRESS NOTE ADULT - ASSESSMENT
78y Male with dementia now with increased agitation.    Dementia.   Given stepwise decline and CT findings, likely vascular dementia.  His work up for reversible causes, negative.    Agitation.  Seen by Behavioral Health Team.  Agree with Quetiapine for now.     Discharge planning.   Will likely need long term care at a facility with a dementia unit.  No further specific neurologic recommendations. Will be available as needed.     Case discussed with Dr Osorio.

## 2023-05-19 NOTE — PROGRESS NOTE ADULT - ASSESSMENT
This is a 78 year old male PMHx  HTN, PH, HLD, Dementia arrived to [facility] due to aggression and agitation. He is no longer able to live on his own and was living with his family for past 3 weeks. No reported falls, fever, N/V, SOB, CP. Patient is ambulatory with no devices. In the ED needed Zyprexa for agitation. Vital with elevated BP, exam non-focal, CTH with chronic microvascular changes, Labs normal. Seen by BH/CM in ED and at this time will need evaluation for placement due to unsafe DC and family unable to care for him at home. Palliative care consulted from  for ED Project.    Problem/Recommendation 1:Dementia  Patient with moderate dementia - Functional Assessment Staging Scale (FAST)-4- 5 - IADLs become affected, starting to need assist   Patient able to recognize his short term memory is deteriorating but long term is still stable - able to hold conversation but goes off on tangents and needs to be redirected -Noted some paranoia like thought processes  Still able to, sit stand, walk independently - able to recognize needs for toileting and bathing    Problem/Recommendation 2:AMS/Delirium  Consider trying to avoid/minimize deliriogenic drugs such as benzodiazepines and anticholinergics   Non pharmacologic options for delirium: Frequently orient patient, identify self, maintain consistent staffing and location   Limit stimulation, soft voice, soft lighting, gentle handling  Sleep disturbance support  Provide adequate sensory aides such as hearing aids, glasses, calendar, clock  Soft warm blankets  Bed alarm for safety    Problem/Recommendation 3: Debility  Assist in ADLS  Maintain safety, fall, aspiration precautions  Set bed alarm, chair alarm for safety and fall prevention  Turn and Position in bed     Problem/Recommendation 4: Palliative Care Encounter  Patient with agitation last night given ativan and haldol per note check  Met with patient at bedside-  calm in behavior during assessment   No obvious symptoms or signs of discomfort or distress  Will attempt to reach patient's family to further discuss goals of care   Palliative care to follow    Total Time Spent__30__ minutes  This includes chart review, patient assessment, discussion and collaboration with interdisciplinary team members, ACP planning      Thank you for the opportunity to assist with the care of this patient.   [facility] Palliative Medicine Consult Service [phone].    This is a 78 year old male PMHx  HTN, PH, HLD, Dementia arrived to [facility] due to aggression and agitation. He is no longer able to live on his own and was living with his family for past 3 weeks. No reported falls, fever, N/V, SOB, CP. Patient is ambulatory with no devices. In the ED needed Zyprexa for agitation. Vital with elevated BP, exam non-focal, CTH with chronic microvascular changes, Labs normal. Seen by BH/CM in ED and at this time will need evaluation for placement due to unsafe DC and family unable to care for him at home. Palliative care consulted from  for ED Project.    Problem/Recommendation 1:Dementia  Patient with moderate dementia - Functional Assessment Staging Scale (FAST)-4- 5 - IADLs become affected, starting to need assist   Patient able to recognize his short term memory is deteriorating but long term is still stable - able to hold conversation but goes off on tangents and needs to be redirected -Noted some paranoia like thought processes  Still able to, sit stand, walk independently - able to recognize needs for toileting and bathing    Problem/Recommendation 2:AMS/Delirium  Consider trying to avoid/minimize deliriogenic drugs such as benzodiazepines and anticholinergics   Non pharmacologic options for delirium: Frequently orient patient, identify self, maintain consistent staffing and location   Limit stimulation, soft voice, soft lighting, gentle handling  Sleep disturbance support  Provide adequate sensory aides such as hearing aids, glasses, calendar, clock  Soft warm blankets  Bed alarm for safety    Problem/Recommendation 3: Debility  Assist in ADLS  Maintain safety, fall, aspiration precautions  Set bed alarm, chair alarm for safety and fall prevention  Turn and Position in bed     Problem/Recommendation 4: Palliative Care Encounter  Patient with agitation last night given ativan and haldol per note check  Met with patient at bedside-  calm in behavior during assessment   No obvious symptoms or signs of discomfort or distress  Reached out to Broderick to discuss GOC - at baseline patient is AO x 2 - is independent with walking and ADLS such as feeding and dressing per brother Broderick  Confirmed Broderick is his surrogate - he states his sister is also involved - no official HCP document   Surrogate decision making would fall equally to the both adult children unless form is provided indicating one over the other or primary/alternative   Patient was living independently at home alone and then recently moved in with Broderick as he became more symptomatic in regards to agitation and confusion  Broderick acknowledges the progression of his brother's dementia - goal is for him to return home with private hire aides and eventually he may transition him to LTC dementia unit   Discussed progression of dementia and eligibility for hospice support at home as his dementia advances - Broderick was receptive and stated he had his father on home hospice too for similar issues and this would be a consideration as his disease progresses  Discussed code status - Broderick states no living will or DNR has been established that he is aware of - he is Full Code CPR and intubation  Goal to continue to medically optimize  Discharge planning per social work/case management    GOC have been established, no further needs to be addressed from Palliative care perspective.  Will sign off at this time. Please reconsult if GOC change or condition decompensates requiring further palliative care assistance.  HCP/Surrogate:  Code Status:                                 Total Time Spent__45__ minutes  This includes chart review, patient assessment, discussion and collaboration with interdisciplinary team members, ACP planning  ~18 minutes in GOC/acp planning    Thank you for the opportunity to assist with the care of this patient.   [facility] Palliative Medicine Consult Service [phone].    This is a 78 year old male PMHx  HTN, PH, HLD, Dementia arrived to [facility] due to aggression and agitation. He is no longer able to live on his own and was living with his family for past 3 weeks. No reported falls, fever, N/V, SOB, CP. Patient is ambulatory with no devices. In the ED needed Zyprexa for agitation. Vital with elevated BP, exam non-focal, CTH with chronic microvascular changes, Labs normal. Seen by BH/CM in ED and at this time will need evaluation for placement due to unsafe DC and family unable to care for him at home. Palliative care consulted from  for ED Project.    Problem/Recommendation 1:Dementia  Patient with moderate dementia - Functional Assessment Staging Scale (FAST)-4- 5 - IADLs become affected, starting to need assist   Patient able to recognize his short term memory is deteriorating but long term is still stable - able to hold conversation but goes off on tangents and needs to be redirected -Noted some paranoia like thought processes  Still able to, sit stand, walk independently - able to recognize needs for toileting and bathing    Problem/Recommendation 2:AMS/Delirium  Consider trying to avoid/minimize deliriogenic drugs such as benzodiazepines and anticholinergics   Non pharmacologic options for delirium: Frequently orient patient, identify self, maintain consistent staffing and location   Limit stimulation, soft voice, soft lighting, gentle handling  Sleep disturbance support  Provide adequate sensory aides such as hearing aids, glasses, calendar, clock  Soft warm blankets  Bed alarm for safety    Problem/Recommendation 3: Debility  Assist in ADLS  Maintain safety, fall, aspiration precautions  Set bed alarm, chair alarm for safety and fall prevention  Turn and Position in bed     Problem/Recommendation 4: Palliative Care Encounter  Patient with agitation last night given ativan and haldol per note check  Met with patient at bedside-  calm in behavior during assessment   No obvious symptoms or signs of discomfort or distress  Reached out to Broderick to discuss GOC - at baseline patient is AO x 2 - is independent with walking and ADLS such as feeding and dressing per brother Broderick  Confirmed Broderick is his surrogate - he states his sister is also involved - no official HCP document   Surrogate decision making would fall equally to the both adult children unless form is provided indicating one over the other or primary/alternative   Patient was living independently at home alone and then recently moved in with Broderick as he became more symptomatic in regards to agitation and confusion  Broderick acknowledges the progression of his brother's dementia - goal is for him to return home with private hire aides and eventually he may transition him to LTC dementia unit   Discussed progression of dementia and eligibility for hospice support at home as his dementia advances - Broderick was receptive and stated he had his father on home hospice too for similar issues and this would be a consideration as his disease progresses  Discussed code status - Broderick states no living will or DNR has been established that he is aware of - he is Full Code CPR and intubation  Goal to continue to medically optimize  Discharge planning per social work/case management    GOC have been established, no further needs to be addressed from Palliative care perspective.  Will sign off at this time. Please reconsult if GOC change or condition decompensates requiring further palliative care assistance.    Total Time Spent__45__ minutes  This includes chart review, patient assessment, discussion and collaboration with interdisciplinary team members, ACP planning  ~18 minutes in GOC/acp planning    Thank you for the opportunity to assist with the care of this patient.   [facility] Palliative Medicine Consult Service [phone].

## 2023-05-19 NOTE — BH CONSULTATION LIAISON PROGRESS NOTE - NSBHCHARTREVIEWVS_PSY_A_CORE FT
Vital Signs Last 24 Hrs  T(C): 36.4 (19 May 2023 05:25), Max: 36.6 (18 May 2023 15:32)  T(F): 97.5 (19 May 2023 05:25), Max: 97.9 (18 May 2023 15:32)  HR: 56 (19 May 2023 05:25) (56 - 76)  BP: 132/79 (19 May 2023 05:25) (108/62 - 161/81)  BP(mean): --  RR: 19 (19 May 2023 05:25) (18 - 19)  SpO2: 96% (19 May 2023 05:25) (95% - 98%)    Parameters below as of 19 May 2023 05:25  Patient On (Oxygen Delivery Method): room air

## 2023-05-19 NOTE — BH CONSULTATION LIAISON PROGRESS NOTE - CURRENT MEDICATION
MEDICATIONS  (STANDING):  atenolol  Tablet 50 milliGRAM(s) Oral daily  atorvastatin 40 milliGRAM(s) Oral at bedtime  finasteride 5 milliGRAM(s) Oral daily  lisinopril 10 milliGRAM(s) Oral daily  QUEtiapine 25 milliGRAM(s) Oral at bedtime  QUEtiapine 12.5 milliGRAM(s) Oral two times a day  sodium chloride 0.9% lock flush 3 milliLiter(s) IV Push every 8 hours  tamsulosin 0.4 milliGRAM(s) Oral at bedtime    MEDICATIONS  (PRN):  acetaminophen     Tablet .. 650 milliGRAM(s) Oral every 6 hours PRN Temp greater or equal to 38C (100.4F), Mild Pain (1 - 3)  aluminum hydroxide/magnesium hydroxide/simethicone Suspension 30 milliLiter(s) Oral every 4 hours PRN Dyspepsia  melatonin 3 milliGRAM(s) Oral at bedtime PRN Insomnia  ondansetron Injectable 4 milliGRAM(s) IV Push every 8 hours PRN Nausea and/or Vomiting

## 2023-05-19 NOTE — PROGRESS NOTE ADULT - SUBJECTIVE AND OBJECTIVE BOX
Palliative Care Followup    OVERNIGHT EVENTS: agitation last night given ativan and haldol - today resting in bed - no overt signs of distress - more tiresome today likely due to ativan and haldol given last night     Present Symptoms: Unable to obtain due to poor mentation     Pain: Denies pain             Character-            Duration-            Effect-            Factors-            Frequency-            Location-            Severity-    Pain AD Score:0  [email or web address] (press ctrl + left click to view)    Review of Systems: Reviewed  Unable to obtain due to poor mentation     MEDICATIONS  (STANDING):  atenolol  Tablet 50 milliGRAM(s) Oral daily  atorvastatin 40 milliGRAM(s) Oral at bedtime  finasteride 5 milliGRAM(s) Oral daily  lisinopril 10 milliGRAM(s) Oral daily  QUEtiapine 25 milliGRAM(s) Oral at bedtime  QUEtiapine 12.5 milliGRAM(s) Oral two times a day  sodium chloride 0.9% lock flush 3 milliLiter(s) IV Push every 8 hours  tamsulosin 0.4 milliGRAM(s) Oral at bedtime    MEDICATIONS  (PRN):  acetaminophen     Tablet .. 650 milliGRAM(s) Oral every 6 hours PRN Temp greater or equal to 38C (100.4F), Mild Pain (1 - 3)  aluminum hydroxide/magnesium hydroxide/simethicone Suspension 30 milliLiter(s) Oral every 4 hours PRN Dyspepsia  melatonin 3 milliGRAM(s) Oral at bedtime PRN Insomnia  ondansetron Injectable 4 milliGRAM(s) IV Push every 8 hours PRN Nausea and/or Vomiting      PHYSICAL EXAM:    Vital Signs Last 24 Hrs  T(C): 36.4 (19 May 2023 05:25), Max: 36.6 (18 May 2023 15:32)  T(F): 97.5 (19 May 2023 05:25), Max: 97.9 (18 May 2023 15:32)  HR: 56 (19 May 2023 05:25) (56 - 76)  BP: 132/79 (19 May 2023 05:25) (104/71 - 161/81)  BP(mean): --  RR: 19 (19 May 2023 05:25) (18 - 19)  SpO2: 96% (19 May 2023 05:25) (95% - 98%)    Parameters below as of 19 May 2023 05:25  Patient On (Oxygen Delivery Method): room air      General: alert oriented x 2-3 - mentation fluctuates     HEENT: dry mouth      Lungs: comfortable     CV: normal      GI:  incontinent     : incontinent      MSK: weakness    Neuro:  cognitive impairment     Skin: normal     LABS:                          14.8   8.16  )-----------( 178      ( 19 May 2023 05:55 )             43.7         144  |  107  |  18.8  ----------------------------<  94  3.7   |  25.0  |  0.77    Ca    8.8      19 May 2023 05:55    TPro  6.7  /  Alb  4.3  /  TBili  0.3<L>  /  DBili  x   /  AST  21  /  ALT  17  /  AlkPhos  56  -      Urinalysis Basic - ( 17 May 2023 17:18 )    Color: Yellow / Appearance: Clear / S.010 / pH: x  Gluc: x / Ketone: Negative  / Bili: Negative / Urobili: Negative mg/dL   Blood: x / Protein: Negative / Nitrite: Negative   Leuk Esterase: Negative / RBC: 0-2 /HPF / WBC 0-2 /HPF   Sq Epi: x / Non Sq Epi: x / Bacteria: Occasional      I&O's Summary      RADIOLOGY & ADDITIONAL STUDIES:  CT head 23  IMPRESSION:  No acute intracranial hemorrhage or acute territorial infarction.   Extensive chronic microvascular ischemic changes. If symptoms persist   consider follow-up imaging with MRI of the brain if no contraindications.    ADVANCE DIRECTIVES/TREATMENT PREFERENCES:  Full Code

## 2023-05-19 NOTE — BH CONSULTATION LIAISON PROGRESS NOTE - NSBHFUPINTERVALHXFT_PSY_A_CORE
Chart reviewed. VSS, afebrile. Labs unremarkable.  Head CT: Extensive chronic microvascular ischemic changes.     Pt was a code grey when he was transferred to unit from ED.  Requiring IM PRNs: Haldol 5 mg x1, Ativan 2 mg x1, Zyprexa 2.5 mg x1, Zyprexa 5 mg x2.    Per nursing patient has been sleeping all morning. Patient is found sleeping in bed.  He is arousable to voice.  Patient is oriented to self otherwise disoriented.

## 2023-05-19 NOTE — PROGRESS NOTE ADULT - ASSESSMENT
Patient is a 78 year old male with Dementia, HTN, HLD, BPH brought in by family for agitation and inability to take care of patient at home.      Dementia with behavioral disturbance  -code gray overnight but calm today  -discontinue constant observation  -continue seroquel BID and at bedtime  -No evidence of infectious/metabolic cause   -CTH with severe microvascular disease, likely Vascular dementia   -Able to ambulate > 100 feet and is not a candidate for HARMONY  -Family arranging for 24/7 aides at home; tentative plans for discharge home with private aides next week  -Psych consult    HTN  -BP stable  -Continue Atenolol and Lisinopril     HLD  - Continue Lipitor    BPH  -Proscar/Flomax    DVT ppx - Lovenox    Dispo - Medically stable for discharge but family needs to arrange for 24/7 private aides in efforts to safely discharge home.    Plan discussed with patient's brother at bedside, RN, SW

## 2023-05-20 PROCEDURE — 99233 SBSQ HOSP IP/OBS HIGH 50: CPT

## 2023-05-20 RX ORDER — OLANZAPINE 15 MG/1
5 TABLET, FILM COATED ORAL ONCE
Refills: 0 | Status: COMPLETED | OUTPATIENT
Start: 2023-05-20 | End: 2023-05-20

## 2023-05-20 RX ORDER — HALOPERIDOL DECANOATE 100 MG/ML
2 INJECTION INTRAMUSCULAR ONCE
Refills: 0 | Status: COMPLETED | OUTPATIENT
Start: 2023-05-20 | End: 2023-05-20

## 2023-05-20 RX ORDER — HALOPERIDOL DECANOATE 100 MG/ML
2.5 INJECTION INTRAMUSCULAR ONCE
Refills: 0 | Status: COMPLETED | OUTPATIENT
Start: 2023-05-20 | End: 2023-05-20

## 2023-05-20 RX ADMIN — ATORVASTATIN CALCIUM 40 MILLIGRAM(S): 80 TABLET, FILM COATED ORAL at 21:04

## 2023-05-20 RX ADMIN — OLANZAPINE 5 MILLIGRAM(S): 15 TABLET, FILM COATED ORAL at 21:04

## 2023-05-20 RX ADMIN — SODIUM CHLORIDE 3 MILLILITER(S): 9 INJECTION INTRAMUSCULAR; INTRAVENOUS; SUBCUTANEOUS at 21:04

## 2023-05-20 RX ADMIN — TAMSULOSIN HYDROCHLORIDE 0.4 MILLIGRAM(S): 0.4 CAPSULE ORAL at 21:04

## 2023-05-20 RX ADMIN — OLANZAPINE 5 MILLIGRAM(S): 15 TABLET, FILM COATED ORAL at 03:40

## 2023-05-20 RX ADMIN — QUETIAPINE FUMARATE 25 MILLIGRAM(S): 200 TABLET, FILM COATED ORAL at 21:04

## 2023-05-20 RX ADMIN — HALOPERIDOL DECANOATE 2.5 MILLIGRAM(S): 100 INJECTION INTRAMUSCULAR at 23:06

## 2023-05-20 RX ADMIN — SODIUM CHLORIDE 3 MILLILITER(S): 9 INJECTION INTRAMUSCULAR; INTRAVENOUS; SUBCUTANEOUS at 14:23

## 2023-05-20 RX ADMIN — SODIUM CHLORIDE 3 MILLILITER(S): 9 INJECTION INTRAMUSCULAR; INTRAVENOUS; SUBCUTANEOUS at 05:02

## 2023-05-20 RX ADMIN — QUETIAPINE FUMARATE 12.5 MILLIGRAM(S): 200 TABLET, FILM COATED ORAL at 05:01

## 2023-05-20 RX ADMIN — HALOPERIDOL DECANOATE 2 MILLIGRAM(S): 100 INJECTION INTRAMUSCULAR at 08:03

## 2023-05-20 RX ADMIN — FINASTERIDE 5 MILLIGRAM(S): 5 TABLET, FILM COATED ORAL at 13:24

## 2023-05-20 RX ADMIN — ATENOLOL 50 MILLIGRAM(S): 25 TABLET ORAL at 05:02

## 2023-05-20 RX ADMIN — LISINOPRIL 10 MILLIGRAM(S): 2.5 TABLET ORAL at 05:02

## 2023-05-20 RX ADMIN — QUETIAPINE FUMARATE 12.5 MILLIGRAM(S): 200 TABLET, FILM COATED ORAL at 18:03

## 2023-05-20 NOTE — PROGRESS NOTE ADULT - SUBJECTIVE AND OBJECTIVE BOX
CHIEF COMPLAINT/INTERVAL HISTORY:    Patient is a 78y old  Male who presents with a chief complaint of Dementia  Agitation   Placement     SUBJECTIVE & OBJECTIVE: Pt seen and examined at bedside. agiated overnight again requiring sedation in addition to constant observation    ROS: Unobtainable due to cognition       Vital Signs Last 24 Hrs  T(C): 36.3 (20 May 2023 16:11), Max: 36.6 (19 May 2023 19:45)  T(F): 97.4 (20 May 2023 16:11), Max: 97.9 (19 May 2023 19:45)  HR: 64 (20 May 2023 16:11) (60 - 75)  BP: 124/79 (20 May 2023 16:11) (107/69 - 135/80)  BP(mean): --  RR: 18 (20 May 2023 16:11) (18 - 19)  SpO2: 98% (20 May 2023 16:11) (94% - 98%)    Parameters below as of 20 May 2023 16:11  Patient On (Oxygen Delivery Method): room air        PHYSICAL EXAM:    GENERAL: elderly male, laying in bed, NAD  HEAD:  Atraumatic, Normocephalic  EYES: EOMI, PERRLA, conjunctiva and sclera clear  ENMT: Moist mucous membranes  NECK: Supple   NERVOUS SYSTEM:  Alert & Oriented X1  CHEST/LUNG: Clear to auscultation bilaterally   HEART: Regular rate and rhythm; + S1/S2  ABDOMEN: Soft, Nontender, Nondistended; Bowel sounds present  EXTREMITIES:  no pedal edema      MEDICATIONS  (STANDING):  atenolol  Tablet 50 milliGRAM(s) Oral daily  atorvastatin 40 milliGRAM(s) Oral at bedtime  finasteride 5 milliGRAM(s) Oral daily  lisinopril 10 milliGRAM(s) Oral daily  QUEtiapine 25 milliGRAM(s) Oral at bedtime  QUEtiapine 12.5 milliGRAM(s) Oral two times a day  sodium chloride 0.9% lock flush 3 milliLiter(s) IV Push every 8 hours  tamsulosin 0.4 milliGRAM(s) Oral at bedtime    MEDICATIONS  (PRN):  acetaminophen     Tablet .. 650 milliGRAM(s) Oral every 6 hours PRN Temp greater or equal to 38C (100.4F), Mild Pain (1 - 3)  aluminum hydroxide/magnesium hydroxide/simethicone Suspension 30 milliLiter(s) Oral every 4 hours PRN Dyspepsia  melatonin 3 milliGRAM(s) Oral at bedtime PRN Insomnia  ondansetron Injectable 4 milliGRAM(s) IV Push every 8 hours PRN Nausea and/or Vomiting      CHIEF COMPLAINT/INTERVAL HISTORY:    Patient is a 78y old  Male who presents with a chief complaint of Dementia  Agitation   Placement     SUBJECTIVE & OBJECTIVE: Pt seen and examined at bedside. agiated overnight again requiring sedation in addition to constant observation    ROS: Unobtainable due to cognition       Vital Signs Last 24 Hrs  T(C): 36.3 (20 May 2023 16:11), Max: 36.6 (19 May 2023 19:45)  T(F): 97.4 (20 May 2023 16:11), Max: 97.9 (19 May 2023 19:45)  HR: 64 (20 May 2023 16:11) (60 - 75)  BP: 124/79 (20 May 2023 16:11) (107/69 - 135/80)  BP(mean): --  RR: 18 (20 May 2023 16:11) (18 - 19)  SpO2: 98% (20 May 2023 16:11) (94% - 98%)    Parameters below as of 20 May 2023 16:11  Patient On (Oxygen Delivery Method): room air        PHYSICAL EXAM:    GENERAL: elderly male, awake sitting in bed, [place]  HEAD:  Atraumatic, Normocephalic  EYES: EOMI, PERRLA, conjunctiva and sclera clear  ENMT: Moist mucous membranes  NECK: Supple   Chest - CTAb   CVS - S1,S2 - normal   NERVOUS SYSTEM:  Alert & Oriented X1  CHEST/LUNG: Clear to auscultation bilaterally   HEART: Regular rate and rhythm; + S1/S2  ABDOMEN: Soft, Nontender, Nondistended; Bowel sounds present  EXTREMITIES:  no pedal edema      MEDICATIONS  (STANDING):  atenolol  Tablet 50 milliGRAM(s) Oral daily  atorvastatin 40 milliGRAM(s) Oral at bedtime  finasteride 5 milliGRAM(s) Oral daily  lisinopril 10 milliGRAM(s) Oral daily  QUEtiapine 25 milliGRAM(s) Oral at bedtime  QUEtiapine 12.5 milliGRAM(s) Oral two times a day  sodium chloride 0.9% lock flush 3 milliLiter(s) IV Push every 8 hours  tamsulosin 0.4 milliGRAM(s) Oral at bedtime    MEDICATIONS  (PRN):  acetaminophen     Tablet .. 650 milliGRAM(s) Oral every 6 hours PRN Temp greater or equal to 38C (100.4F), Mild Pain (1 - 3)  aluminum hydroxide/magnesium hydroxide/simethicone Suspension 30 milliLiter(s) Oral every 4 hours PRN Dyspepsia  melatonin 3 milliGRAM(s) Oral at bedtime PRN Insomnia  ondansetron Injectable 4 milliGRAM(s) IV Push every 8 hours PRN Nausea and/or Vomiting

## 2023-05-20 NOTE — CHART NOTE - NSCHARTNOTEFT_GEN_A_CORE
Patient restless, walking around the unit with unsteady gait   Constant observation reordered for patient safety Patient restless, attempting to get out of bed with unsteady gait   Patient unable to be redirected   Constant observation reordered for patient safety  Zyprexa 5mg IM x1 as per psych recommendations  RN to notify with any acute changes Patient restless, attempting to get out of bed with unsteady gait   Patient unable to be redirected   Constant observation reordered for patient safety  Zyprexa 5mg IM x1 as per psych recommendations    Addendum 22:45   Patient agitated, hitting staff  1:1 at bedside, still cannot be redirected   Haldol 2.5mg IM x1 for staff and patient safety   RN to notify with any acute changes

## 2023-05-20 NOTE — DIETITIAN NUTRITION RISK NOTIFICATION - ADDITIONAL COMMENTS/DIETITIAN RECOMMENDATIONS
1) Liberalize to regular diet   2) Add Ensure HP/Enlive TID  3) Rx MVI daily  4) Encourage HBV protein sources  5) Monitor weights daily for trend/accuracy

## 2023-05-20 NOTE — DIETITIAN INITIAL EVALUATION ADULT - PERTINENT LABORATORY DATA
05-19    144  |  107  |  18.8  ----------------------------<  94  3.7   |  25.0  |  0.77    Ca    8.8      19 May 2023 05:55

## 2023-05-20 NOTE — PROGRESS NOTE ADULT - ASSESSMENT
Patient is a 78 year old male with Dementia, HTN, HLD, BPH brought in by family for agitation and inability to take care of patient at home.      Dementia with behavioral disturbance  - on  constant observation  -continue seroquel BID and at bedtime  -No evidence of infectious/metabolic cause   -CTH with severe microvascular disease, likely Vascular dementia   -Able to ambulate > 100 feet and is not a candidate for HARMONY  -Family arranging for 24/7 aides at home; tentative plans for discharge home with private aides next week  -Psych reviewed    HTN  -BP stable  -Continue Atenolol and Lisinopril     HLD  - Continue Lipitor    BPH  -Proscar/Flomax    DVT ppx - Lovenox    Dispo - Medically stable for discharge but family needs to arrange for 24/7 private aides in efforts to safely discharge home.    Plan discussed with patient's brother at bedside, RN, SW

## 2023-05-20 NOTE — DIETITIAN INITIAL EVALUATION ADULT - OTHER INFO
77 y/o male with hx of HTN, PH, HLD, recently diagnosed Dementia after a year of stepwise decline in cognitive function and ADLs. Had a recent evaluation at [facility] with a reported negative metabolic/infection/CVA w/u. He is no longer able to live on his own and was living with his family for past 3 weeks. Brought in today due to periods of aggression and agitation. Patient also not taking his usual medications at times. No reported falls, fever, N/V, SOB, CP. Patient is ambulatory with no devices. In the ED needed Zyprexa for agitation. Vital with elevated BP, exam non-focal, CTH with chronic microvascular changes, Labs normal. No reports of substance abuse. Seen by BH/CM in ED and at this time will need evaluation for placement due to unsafe DC and family unable to care for him at home.

## 2023-05-20 NOTE — DIETITIAN INITIAL EVALUATION ADULT - ETIOLOGY
related to inability to meet sufficient protein-energy needs in setting of cognitive decline 2/2 dementia

## 2023-05-20 NOTE — DIETITIAN INITIAL EVALUATION ADULT - ORAL INTAKE PTA/DIET HISTORY
Pt noted with decreased cognition and ability to complete ADLs PTA 2/2 dementia with behavioral disturbances. Pt sleeping soundly, per 1:1 requesting not to wake Pt for interview/meal at this time. RD bedscale weight 130lbs, estimated height 68."

## 2023-05-21 PROCEDURE — 99232 SBSQ HOSP IP/OBS MODERATE 35: CPT

## 2023-05-21 RX ADMIN — LISINOPRIL 10 MILLIGRAM(S): 2.5 TABLET ORAL at 05:10

## 2023-05-21 RX ADMIN — Medication 3 MILLIGRAM(S): at 21:16

## 2023-05-21 RX ADMIN — QUETIAPINE FUMARATE 25 MILLIGRAM(S): 200 TABLET, FILM COATED ORAL at 21:13

## 2023-05-21 RX ADMIN — SODIUM CHLORIDE 3 MILLILITER(S): 9 INJECTION INTRAMUSCULAR; INTRAVENOUS; SUBCUTANEOUS at 05:11

## 2023-05-21 RX ADMIN — ATENOLOL 50 MILLIGRAM(S): 25 TABLET ORAL at 05:10

## 2023-05-21 RX ADMIN — Medication 1 MILLIGRAM(S): at 00:17

## 2023-05-21 RX ADMIN — QUETIAPINE FUMARATE 12.5 MILLIGRAM(S): 200 TABLET, FILM COATED ORAL at 05:10

## 2023-05-21 RX ADMIN — TAMSULOSIN HYDROCHLORIDE 0.4 MILLIGRAM(S): 0.4 CAPSULE ORAL at 21:14

## 2023-05-21 RX ADMIN — QUETIAPINE FUMARATE 12.5 MILLIGRAM(S): 200 TABLET, FILM COATED ORAL at 17:57

## 2023-05-21 RX ADMIN — ATORVASTATIN CALCIUM 40 MILLIGRAM(S): 80 TABLET, FILM COATED ORAL at 21:13

## 2023-05-21 RX ADMIN — FINASTERIDE 5 MILLIGRAM(S): 5 TABLET, FILM COATED ORAL at 14:01

## 2023-05-21 NOTE — PROGRESS NOTE ADULT - SUBJECTIVE AND OBJECTIVE BOX
CHIEF COMPLAINT/INTERVAL HISTORY:    chief complaint of Dementia  Agitation   Placement     SUBJECTIVE & OBJECTIVE: Pt seen and examined at bedside. agitated overnight again requiring sedation in addition to constant observation    ROS: denies any pain, sob       Vital Signs Last 24 Hrs  T(C): 36.6 (21 May 2023 12:58), Max: 36.8 (20 May 2023 19:44)  T(F): 97.8 (21 May 2023 12:58), Max: 98.3 (20 May 2023 19:44)  HR: 71 (21 May 2023 12:58) (61 - 78)  BP: 126/81 (21 May 2023 12:58) (109/60 - 135/84)  BP(mean): --  RR: 18 (21 May 2023 12:58) (18 - 19)  SpO2: 97% (21 May 2023 12:58) (94% - 98%)    Parameters below as of 21 May 2023 12:58  Patient On (Oxygen Delivery Method): nasal cannula  O2 Flow (L/min): 3        PHYSICAL EXAM:    GENERAL: elderly male, laying in bed, NAD  HEAD:  Atraumatic, Normocephalic  EYES: EOMI, PERRLA, conjunctiva and sclera clear  ENMT: Moist mucous membranes  NECK: Supple   NERVOUS SYSTEM:  Alert & Oriented X1  CHEST/LUNG: Clear to auscultation bilaterally   HEART: Regular rate and rhythm; + S1/S2  ABDOMEN: Soft, Nontender, Nondistended; Bowel sounds present  EXTREMITIES:  no pedal edema      MEDICATIONS  (STANDING):  atenolol  Tablet 50 milliGRAM(s) Oral daily  atorvastatin 40 milliGRAM(s) Oral at bedtime  finasteride 5 milliGRAM(s) Oral daily  lisinopril 10 milliGRAM(s) Oral daily  QUEtiapine 25 milliGRAM(s) Oral at bedtime  QUEtiapine 12.5 milliGRAM(s) Oral two times a day  tamsulosin 0.4 milliGRAM(s) Oral at bedtime    MEDICATIONS  (PRN):  acetaminophen     Tablet .. 650 milliGRAM(s) Oral every 6 hours PRN Temp greater or equal to 38C (100.4F), Mild Pain (1 - 3)  aluminum hydroxide/magnesium hydroxide/simethicone Suspension 30 milliLiter(s) Oral every 4 hours PRN Dyspepsia  melatonin 3 milliGRAM(s) Oral at bedtime PRN Insomnia  ondansetron Injectable 4 milliGRAM(s) IV Push every 8 hours PRN Nausea and/or Vomiting

## 2023-05-21 NOTE — PROGRESS NOTE ADULT - REASON FOR ADMISSION
Dementia  Agitation   Placement

## 2023-05-21 NOTE — PROGRESS NOTE ADULT - NUTRITIONAL ASSESSMENT
This patient has been assessed with a concern for Malnutrition and has been determined to have a diagnosis/diagnoses of Severe protein-calorie malnutrition.    This patient is being managed with:   Diet DASH/TLC-  Sodium & Cholesterol Restricted  Entered: May 17 2023  9:49PM  
This patient has been assessed with a concern for Malnutrition and has been determined to have a diagnosis/diagnoses of Severe protein-calorie malnutrition.    This patient is being managed with:   Diet DASH/TLC-  Sodium & Cholesterol Restricted  Entered: May 17 2023  9:49PM

## 2023-05-22 ENCOUNTER — TRANSCRIPTION ENCOUNTER (OUTPATIENT)
Age: 78
End: 2023-05-22

## 2023-05-22 VITALS
RESPIRATION RATE: 18 BRPM | HEART RATE: 79 BPM | DIASTOLIC BLOOD PRESSURE: 85 MMHG | SYSTOLIC BLOOD PRESSURE: 155 MMHG | OXYGEN SATURATION: 96 % | TEMPERATURE: 98 F

## 2023-05-22 LAB
ANION GAP SERPL CALC-SCNC: 12 MMOL/L — SIGNIFICANT CHANGE UP (ref 5–17)
BUN SERPL-MCNC: 18.9 MG/DL — SIGNIFICANT CHANGE UP (ref 8–20)
CALCIUM SERPL-MCNC: 9.1 MG/DL — SIGNIFICANT CHANGE UP (ref 8.4–10.5)
CHLORIDE SERPL-SCNC: 107 MMOL/L — SIGNIFICANT CHANGE UP (ref 96–108)
CO2 SERPL-SCNC: 26 MMOL/L — SIGNIFICANT CHANGE UP (ref 22–29)
CREAT SERPL-MCNC: 0.86 MG/DL — SIGNIFICANT CHANGE UP (ref 0.5–1.3)
EGFR: 90 ML/MIN/1.73M2 — SIGNIFICANT CHANGE UP
GLUCOSE SERPL-MCNC: 108 MG/DL — HIGH (ref 70–99)
HCT VFR BLD CALC: 42.8 % — SIGNIFICANT CHANGE UP (ref 39–50)
HGB BLD-MCNC: 14.3 G/DL — SIGNIFICANT CHANGE UP (ref 13–17)
MCHC RBC-ENTMCNC: 31.8 PG — SIGNIFICANT CHANGE UP (ref 27–34)
MCHC RBC-ENTMCNC: 33.4 GM/DL — SIGNIFICANT CHANGE UP (ref 32–36)
MCV RBC AUTO: 95.1 FL — SIGNIFICANT CHANGE UP (ref 80–100)
PLATELET # BLD AUTO: 175 K/UL — SIGNIFICANT CHANGE UP (ref 150–400)
POTASSIUM SERPL-MCNC: 4.4 MMOL/L — SIGNIFICANT CHANGE UP (ref 3.5–5.3)
POTASSIUM SERPL-SCNC: 4.4 MMOL/L — SIGNIFICANT CHANGE UP (ref 3.5–5.3)
RBC # BLD: 4.5 M/UL — SIGNIFICANT CHANGE UP (ref 4.2–5.8)
RBC # FLD: 12.8 % — SIGNIFICANT CHANGE UP (ref 10.3–14.5)
SODIUM SERPL-SCNC: 145 MMOL/L — SIGNIFICANT CHANGE UP (ref 135–145)
WBC # BLD: 9.35 K/UL — SIGNIFICANT CHANGE UP (ref 3.8–10.5)
WBC # FLD AUTO: 9.35 K/UL — SIGNIFICANT CHANGE UP (ref 3.8–10.5)

## 2023-05-22 PROCEDURE — 80048 BASIC METABOLIC PNL TOTAL CA: CPT

## 2023-05-22 PROCEDURE — 85025 COMPLETE CBC W/AUTO DIFF WBC: CPT

## 2023-05-22 PROCEDURE — 70450 CT HEAD/BRAIN W/O DYE: CPT

## 2023-05-22 PROCEDURE — 86803 HEPATITIS C AB TEST: CPT

## 2023-05-22 PROCEDURE — 87086 URINE CULTURE/COLONY COUNT: CPT

## 2023-05-22 PROCEDURE — 36415 COLL VENOUS BLD VENIPUNCTURE: CPT

## 2023-05-22 PROCEDURE — 93005 ELECTROCARDIOGRAM TRACING: CPT

## 2023-05-22 PROCEDURE — 85027 COMPLETE CBC AUTOMATED: CPT

## 2023-05-22 PROCEDURE — 87637 SARSCOV2&INF A&B&RSV AMP PRB: CPT

## 2023-05-22 PROCEDURE — 71045 X-RAY EXAM CHEST 1 VIEW: CPT

## 2023-05-22 PROCEDURE — 83880 ASSAY OF NATRIURETIC PEPTIDE: CPT

## 2023-05-22 PROCEDURE — 86780 TREPONEMA PALLIDUM: CPT

## 2023-05-22 PROCEDURE — 96372 THER/PROPH/DIAG INJ SC/IM: CPT

## 2023-05-22 PROCEDURE — 80053 COMPREHEN METABOLIC PANEL: CPT

## 2023-05-22 PROCEDURE — 99239 HOSP IP/OBS DSCHRG MGMT >30: CPT

## 2023-05-22 PROCEDURE — 82607 VITAMIN B-12: CPT

## 2023-05-22 PROCEDURE — 81001 URINALYSIS AUTO W/SCOPE: CPT

## 2023-05-22 PROCEDURE — 70450 CT HEAD/BRAIN W/O DYE: CPT | Mod: 26

## 2023-05-22 PROCEDURE — 82746 ASSAY OF FOLIC ACID SERUM: CPT

## 2023-05-22 PROCEDURE — 99285 EMERGENCY DEPT VISIT HI MDM: CPT | Mod: 25

## 2023-05-22 PROCEDURE — 84443 ASSAY THYROID STIM HORMONE: CPT

## 2023-05-22 PROCEDURE — 84484 ASSAY OF TROPONIN QUANT: CPT

## 2023-05-22 RX ORDER — OLANZAPINE 15 MG/1
5 TABLET, FILM COATED ORAL ONCE
Refills: 0 | Status: DISCONTINUED | OUTPATIENT
Start: 2023-05-22 | End: 2023-05-22

## 2023-05-22 RX ORDER — QUETIAPINE FUMARATE 200 MG/1
1 TABLET, FILM COATED ORAL
Qty: 30 | Refills: 0
Start: 2023-05-22 | End: 2023-06-20

## 2023-05-22 RX ORDER — HALOPERIDOL DECANOATE 100 MG/ML
1 INJECTION INTRAMUSCULAR
Qty: 20 | Refills: 0
Start: 2023-05-22 | End: 2023-05-31

## 2023-05-22 RX ADMIN — QUETIAPINE FUMARATE 12.5 MILLIGRAM(S): 200 TABLET, FILM COATED ORAL at 05:25

## 2023-05-22 RX ADMIN — Medication 1 MILLIGRAM(S): at 04:11

## 2023-05-22 RX ADMIN — FINASTERIDE 5 MILLIGRAM(S): 5 TABLET, FILM COATED ORAL at 12:15

## 2023-05-22 RX ADMIN — LISINOPRIL 10 MILLIGRAM(S): 2.5 TABLET ORAL at 05:25

## 2023-05-22 RX ADMIN — ATENOLOL 50 MILLIGRAM(S): 25 TABLET ORAL at 05:25

## 2023-05-22 RX ADMIN — Medication 1 MILLIGRAM(S): at 00:31

## 2023-05-22 NOTE — DISCHARGE NOTE NURSING/CASE MANAGEMENT/SOCIAL WORK - NSDCPEFALRISK_GEN_ALL_CORE
For information on Fall & Injury Prevention, visit: [email or web address] OR  [email or web address] OR  https://www.cdc.gov/steadi/patient.html

## 2023-05-22 NOTE — PROVIDER CONTACT NOTE (FALL NOTIFICATION) - ASSESSMENT
Confuse, agitated, aggressive, skin intact, RFA 20". Unsteady on his feet. Continent. uses the urinal.
Vitals stable, PA ware

## 2023-05-22 NOTE — DISCHARGE NOTE PROVIDER - HOSPITAL COURSE
78 year old male with PMHx of HTN, PH, HLD, recently diagnosed dementia, recently worked up at [facility] which was negative for metabolic/infection/CVA, who presented from home with family with c/o periods of aggression, agitation, and inability to take care of himself. In the ED, patient was treated with zyprexa for agitation. CTH revealed chronic microvascular chagnes. Evaluated by , and admitted for management of dementia with behavioral disturbances. Patient was evaluated by PT, able to ambulate > 100 ft and not a candidate for HARMONY. Family arranging 24 hour aides at home. Course c/b unwitnessed fall, CTH negative and patient was without any acute injuries.     - Pending aides 78 year old male with PMHx of HTN, PH, HLD, recently diagnosed dementia, recently worked up at [facility] which was negative for metabolic/infection/CVA, who presented from home with family with c/o periods of aggression, agitation, and inability to take care of himself. In the ED, patient was treated with zyprexa for agitation. CTH revealed chronic microvascular changes. Evaluated by , and admitted for management of dementia with behavioral disturbances. Patient was evaluated by PT, able to ambulate > 100 ft and not a candidate for HARMONY. Family arranged 24 hour aides at home. Course c/b unwitnessed fall, CTH negative and patient was without any acute injuries.   Discussed with brother at bedside - they would like to honor his wishes to be in his aprtment as much as possible, they are willing to do anything for this. Discussed the high risk of agitaion, ijnury to himself and others. close follow up with PMD may be challenging. Family understands but still would like to take him home with 24hr care.

## 2023-05-22 NOTE — CHART NOTE - NSCHARTNOTEFT_GEN_A_CORE
Called by RN for partially unwitnessed fall. Patient with history of dementia, patient got out of bed and grabbed the curtain and fell to his buttocks. Unclear if patient hit his head as the aide was unable to see the patient's fall behind the curtain   Patient denies any head trauma or pain or discomfort at this time.    BP: 131/76  HR: 78  O2: 96    Neuro: AxOx1, restless   PERLL, CN 2-12 grossly intact, moves all extremities without difficulty, speech clear  Head: NC/AT, no evidence of trauma, no lumps, bumps or ecchymotic areas  Neck: supple, non-tender  Back: non-tender, no evidence of trauma  Chest: non-tender chest wall and clavicles, no ecchymosis, cta b/l  Hips: non-tender, no leg length discrepancy, weight bearing not assessed due to weakness  Coccyx: non-tender, no evidence of trauma  Extremities: non-tender over all long bones and joints, knees non-tender, no erythema, no evidence of trauma, full range of motion    No obvious injury  CT head ordered as fall was unwitnessed   1:1 at bedside   Ativan 1mg IVP x1 as other medications are not effective for patient's agitation/restlessness    Continue to monitor, notify PA of any changes in patient status Called by RN for partially unwitnessed fall. Patient with history of dementia, patient got out of bed and grabbed the curtain and fell to his buttocks. Unclear if patient hit his head as the aide was unable to see the patient's fall behind the curtain   Patient denies any head trauma or pain or discomfort at this time.    BP: 131/76  HR: 78  O2: 96    Neuro: AxOx1, restless   PERLL, CN 2-12 grossly intact, moves all extremities without difficulty, speech clear  Head: NC/AT, no evidence of trauma, no lumps, bumps or ecchymotic areas  Neck: supple, non-tender  Back: non-tender, no evidence of trauma  Chest: non-tender chest wall and clavicles, no ecchymosis, cta b/l  Hips: non-tender, no leg length discrepancy, weight bearing not assessed due to weakness  Coccyx: non-tender, no evidence of trauma  Extremities: non-tender over all long bones and joints, knees non-tender, no erythema, no evidence of trauma, full range of motion    No obvious injury  CT head ordered as fall was unwitnessed   1:1 at bedside   Ativan 1mg IVP x1 as this has help in the past with patient's agitation    Continue to monitor, notify PA of any changes in patient status    Addendum 3:10     CT Head No Con    IMPRESSION:  No evidence of acute intracranial hemorrhage, midline shift or CT   evidence of acute territorial infarct.    Patient still aggressive and impulsive   Unable to be redirected   Zyprexa 5mg IM x 1 ordered for patient and staff safety Called by RN for partially unwitnessed fall. Patient with history of dementia, patient got out of bed and grabbed the curtain and fell to his buttocks. Unclear if patient hit his head as the aide was unable to see the patient's fall behind the curtain   Patient denies any head trauma or pain or discomfort at this time.    BP: 131/76  HR: 78  O2: 96    Neuro: AxOx1, restless   PERLL, CN 2-12 grossly intact, moves all extremities without difficulty, speech clear  Head: NC/AT, no evidence of trauma, no lumps, bumps or ecchymotic areas  Neck: supple, non-tender  Back: non-tender, no evidence of trauma  Chest: non-tender chest wall and clavicles, no ecchymosis, cta b/l  Hips: non-tender, no leg length discrepancy, weight bearing not assessed due to weakness  Coccyx: non-tender, no evidence of trauma  Extremities: non-tender over all long bones and joints, knees non-tender, no erythema, no evidence of trauma, full range of motion    No obvious injury  CT head ordered as fall was unwitnessed   1:1 at bedside   Ativan 1mg IVP x1 as this has help in the past with patient's agitation    Continue to monitor, notify PA of any changes in patient status    Addendum 3:10     CT Head No Con    IMPRESSION:  No evidence of acute intracranial hemorrhage, midline shift or CT   evidence of acute territorial infarct.    Patient still aggressive and impulsive   Unable to be redirected   B/l wrist restraints ordered   Ativan 1mg IVP x1 Called by RN for partially unwitnessed fall. Patient with history of dementia, patient got out of bed and grabbed the curtain and fell to his buttocks. Unclear if patient hit his head as the aide was unable to see the patient's fall behind the curtain   Patient denies any head trauma or pain or discomfort at this time.    BP: 131/76  HR: 78  O2: 96    Neuro: AxOx1, restless   PERLL, CN 2-12 grossly intact, moves all extremities without difficulty, speech clear  Head: NC/AT, no evidence of trauma, no lumps, bumps or ecchymotic areas  Neck: supple, non-tender  Back: non-tender, no evidence of trauma  Chest: non-tender chest wall and clavicles, no ecchymosis, cta b/l  Hips: non-tender, no leg length discrepancy, weight bearing not assessed due to weakness  Coccyx: non-tender, no evidence of trauma  Extremities: non-tender over all long bones and joints, knees non-tender, no erythema, no evidence of trauma, full range of motion    No obvious injury  CT head ordered as fall was unwitnessed   1:1 at bedside   Ativan 1mg IVP x1 as this has help in the past with patient's agitation, given Zyprexa and Haldol the past few nights with minimal effect     Continue to monitor, notify PA of any changes in patient status    Addendum 3:10     CT Head No Con    IMPRESSION:  No evidence of acute intracranial hemorrhage, midline shift or CT   evidence of acute territorial infarct.    Patient still aggressive and impulsive, patient getting out of chair attempting to ambulate  Patient lowered himself to his knees in front the RN  Unable to be redirected  Patient placed into bed    B/l wrist restraints ordered   Additional Ativan 1mg IVP x1

## 2023-05-22 NOTE — DISCHARGE NOTE PROVIDER - NSDCCPCAREPLAN_GEN_ALL_CORE_FT
PRINCIPAL DISCHARGE DIAGNOSIS  Diagnosis: Dementia with aggressive behavior  Assessment and Plan of Treatment: - Evaluated by behavioral health team. No evidence of infection or metabolic cause, CTH with chronic microvascular changes.   - Unable to qualify for HARMONY. Family setting up 24/7 aids.      SECONDARY DISCHARGE DIAGNOSES  Diagnosis: Fall  Assessment and Plan of Treatment: - CTH was negative for acute pathology, no acute injuries. Mobilizing well with PT.    Diagnosis: HTN (hypertension)  Assessment and Plan of Treatment: - Continued on home medications.    Diagnosis: HLD (hyperlipidemia)  Assessment and Plan of Treatment: - Continued on home medications.    Diagnosis: History of BPH  Assessment and Plan of Treatment: - Continued on home medications.

## 2023-05-22 NOTE — DISCHARGE NOTE NURSING/CASE MANAGEMENT/SOCIAL WORK - PATIENT PORTAL LINK FT
You can access the FollowMyHealth Patient Portal offered by [facility] by registering at the following website: [email or web address]. By joining [employer]’s FollowMyHealth portal, you will also be able to view your health information using other applications (apps) compatible with our system.

## 2023-05-22 NOTE — PROVIDER CONTACT NOTE (FALL NOTIFICATION) - SITUATION
Patient keep being restless: , patient sit in wheelchair being observe 1:1,. Patient jumped from the chair, Bottom did not touch floor. nurse and NA helped patient back in chair.
Patient insist to get out of bed, When told he can sit in chair or go back to bed he becomes aggressive, and was not listening. He walked backwards hold on to the curtains and fell. I called security
Provider pre-printed instructions given

## 2023-05-22 NOTE — DISCHARGE NOTE PROVIDER - ATTENDING DISCHARGE PHYSICAL EXAMINATION:
PHYSICAL EXAM:    GENERAL: NAD, well-groomed, well-developed  HEAD:  Atraumatic, Normocephalic  EYES: EOMI, PERRLA, conjunctiva and sclera clear  ENMT: ; Moist mucous membranes, Good dentition, No lesions  NECK: Supple, No JVD  NERVOUS SYSTEM:  Alert & Oriented X1 at best., forgetful, confused  CHEST/LUNG: Clear to percussion bilaterally; No rales, rhonchi  HEART: Regular rate and rhythm; No murmurs  ABDOMEN: Soft, Nontender, Nondistended; Bowel sounds present  EXTREMITIES:   No clubbing, cyanosis, or edema

## 2023-05-22 NOTE — DISCHARGE NOTE PROVIDER - NSDCMRMEDTOKEN_GEN_ALL_CORE_FT
atenolol 50 mg oral tablet: 1 tab(s) orally once a day  Crestor 10 mg oral tablet: 1 orally  finasteride 5 mg oral tablet: 1 tab(s) orally once a day  Flomax 0.4 mg oral capsule: 1 tab(s) orally once a day  lisinopril 10 mg oral tablet: 1 tab(s) orally once a day   atenolol 50 mg oral tablet: 1 tab(s) orally once a day  Crestor 10 mg oral tablet: 1 orally  finasteride 5 mg oral tablet: 1 tab(s) orally once a day  Flomax 0.4 mg oral capsule: 1 tab(s) orally once a day  haloperidol 0.5 mg oral tablet: 1 tab(s) orally 2 times a day as needed for  agitation  lisinopril 10 mg oral tablet: 1 tab(s) orally once a day  QUEtiapine 25 mg oral tablet: 1 tab(s) orally once a day (at bedtime)

## 2023-05-22 NOTE — DISCHARGE NOTE PROVIDER - DETAILS OF MALNUTRITION DIAGNOSIS/DIAGNOSES
This patient has been assessed with a concern for Malnutrition and was treated during this hospitalization for the following Nutrition diagnosis/diagnoses:     -  05/20/2023: Severe protein-calorie malnutrition

## 2023-07-12 RX ORDER — TAMSULOSIN HYDROCHLORIDE 0.4 MG/1
1 CAPSULE ORAL
Refills: 0 | DISCHARGE

## 2023-07-12 RX ORDER — ATENOLOL 25 MG/1
1 TABLET ORAL
Refills: 0 | DISCHARGE

## 2023-07-12 RX ORDER — FINASTERIDE 5 MG/1
1 TABLET, FILM COATED ORAL
Refills: 0 | DISCHARGE

## 2023-07-12 RX ORDER — ROSUVASTATIN CALCIUM 5 MG/1
1 TABLET ORAL
Refills: 0 | DISCHARGE

## 2023-07-12 RX ORDER — LISINOPRIL 2.5 MG/1
1 TABLET ORAL
Refills: 0 | DISCHARGE

## 2023-11-08 NOTE — BH CONSULTATION LIAISON ASSESSMENT NOTE - CURRENT MEDICATION
wife
MEDICATIONS  (STANDING):  atenolol  Tablet 50 milliGRAM(s) Oral daily  atorvastatin 40 milliGRAM(s) Oral at bedtime  finasteride 5 milliGRAM(s) Oral daily  lisinopril 10 milliGRAM(s) Oral daily  QUEtiapine 25 milliGRAM(s) Oral at bedtime  QUEtiapine 12.5 milliGRAM(s) Oral two times a day  sodium chloride 0.9% lock flush 3 milliLiter(s) IV Push every 8 hours  tamsulosin 0.4 milliGRAM(s) Oral at bedtime    MEDICATIONS  (PRN):  acetaminophen     Tablet .. 650 milliGRAM(s) Oral every 6 hours PRN Temp greater or equal to 38C (100.4F), Mild Pain (1 - 3)  aluminum hydroxide/magnesium hydroxide/simethicone Suspension 30 milliLiter(s) Oral every 4 hours PRN Dyspepsia  melatonin 3 milliGRAM(s) Oral at bedtime PRN Insomnia  ondansetron Injectable 4 milliGRAM(s) IV Push every 8 hours PRN Nausea and/or Vomiting